# Patient Record
Sex: MALE | Race: WHITE | NOT HISPANIC OR LATINO | Employment: FULL TIME | URBAN - NONMETROPOLITAN AREA
[De-identification: names, ages, dates, MRNs, and addresses within clinical notes are randomized per-mention and may not be internally consistent; named-entity substitution may affect disease eponyms.]

---

## 2024-08-09 ENCOUNTER — APPOINTMENT (OUTPATIENT)
Dept: CT IMAGING | Facility: HOSPITAL | Age: 29
End: 2024-08-09
Payer: OTHER MISCELLANEOUS

## 2024-08-09 ENCOUNTER — HOSPITAL ENCOUNTER (EMERGENCY)
Facility: HOSPITAL | Age: 29
Discharge: HOME | End: 2024-08-09
Attending: STUDENT IN AN ORGANIZED HEALTH CARE EDUCATION/TRAINING PROGRAM
Payer: OTHER MISCELLANEOUS

## 2024-08-09 VITALS
HEIGHT: 73 IN | WEIGHT: 120 LBS | SYSTOLIC BLOOD PRESSURE: 122 MMHG | TEMPERATURE: 98 F | DIASTOLIC BLOOD PRESSURE: 82 MMHG | HEART RATE: 93 BPM | OXYGEN SATURATION: 96 % | BODY MASS INDEX: 15.9 KG/M2 | RESPIRATION RATE: 17 BRPM

## 2024-08-09 DIAGNOSIS — F07.81 POSTCONCUSSIVE SYNDROME: Primary | ICD-10-CM

## 2024-08-09 PROCEDURE — 70450 CT HEAD/BRAIN W/O DYE: CPT

## 2024-08-09 PROCEDURE — 99284 EMERGENCY DEPT VISIT MOD MDM: CPT

## 2024-08-09 NOTE — ED PROVIDER NOTES
Pt Name: Suleiman Urena  MRN: [id number]  : 1995  Date of Encounter: 2024    PCP: Provider, No Known      Subjective    History of Present Illness:    Chief Complaint: Headache    History of Present Illness: Suleiman Urena is a 28 y.o. male who presents to the ER complaining of headache, swelling that started after hitting his head on a metal shelf while at work just prior to arrival.  Pain is described as Dull, Throbbing, Constant, and does to radiate   Patient rates pain as a 6 on a ten scale.    Triage Vitals:    ED Triage Vitals [24 1644]   Temp Heart Rate Resp BP SpO2   98 °F (36.7 °C) 93 17 122/82 96 %      Temp src Heart Rate Source Patient Position BP Location FiO2 (%)   Oral Monitor Sitting Left arm --       Nurses Notes reviewed and agree, including vitals, allergies, social history and prior medical history.     Patient has no known allergies.    No past medical history on file.    No past surgical history on file.    Social History     Socioeconomic History    Marital status: Single       No family history on file.    REVIEW OF SYSTEMS:     All systems reviewed and not pertinent unless noted.    Review of Systems   Neurological:  Positive for headaches.   All other systems reviewed and are negative.      Objective    Physical Exam  Vitals and nursing note reviewed.   Constitutional:       Appearance: Normal appearance.   HENT:      Head: Normocephalic.        Comments: Small area of ecchymosis and small hematoma no break in skin  Eyes:      Extraocular Movements: Extraocular movements intact.      Pupils: Pupils are equal, round, and reactive to light.   Cardiovascular:      Rate and Rhythm: Normal rate and regular rhythm.      Pulses: Normal pulses.      Heart sounds: Normal heart sounds.   Pulmonary:      Effort: Pulmonary effort is normal.      Breath sounds: Normal breath sounds.   Abdominal:      General: Bowel sounds are normal.      Palpations: Abdomen is soft.   Musculoskeletal:          General: Normal range of motion.      Cervical back: Normal range of motion and neck supple.   Skin:     General: Skin is warm and dry.      Capillary Refill: Capillary refill takes less than 2 seconds.   Neurological:      Mental Status: He is alert.      GCS: GCS eye subscore is 4. GCS verbal subscore is 5. GCS motor subscore is 6.      Sensory: Sensation is intact.      Motor: Motor function is intact.   Psychiatric:         Attention and Perception: Attention and perception normal.         Mood and Affect: Mood and affect normal.         Speech: Speech normal.                           Procedures    ED Course:    No orders to display       ED Course as of 08/09/24 1819   Fri Aug 09, 2024   1813 Radiographic images from CT head independently interpreted by me prior to radiology overread and shows no acute intracranial abnormality,    [KH]      ED Course User Index  [KH] Huan Herman APRN       Orders placed during this visit:    Orders Placed This Encounter   Procedures    CT Head Without Contrast       LAB Results:    Lab Results (last 24 hours)       ** No results found for the last 24 hours. **             If labs were ordered, I have independently reviewed the results and considered them in the diagnosis and treatment plan for the patient    RADIOLOGY    No radiology results from the last 24 hrs     If I have ordered, I have independently reviewed the above noted radiographic studies.  Please see the radiologist dictation for the official interpretation    Medications given to patient in the ER    Medications - No data to display    AS OF 18:19 EDT VITALS:    BP - 122/82  HR - 93  TEMP - 98 °F (36.7 °C) (Oral)  O2 SATS - 96%         Shared Decision Making: After my consideration of the clinical presentation and laboratory/radiology studies obtained, I have discussed the findings with the patient/patient representative who is in agreement with the treatment plan and final disposition. Risks and  benefits of discharge and/or observation admission were discussed.  Final disposition of the patient will be discharged home.  Patient is requested to follow-up with primary care provider and specialist in 1 week following final discharge.    Discharge Medications Prescribed:  No new home medications were prescribed during this ER visit    Medical Decision Making   Suleiman Urena is a 28 y.o. male who presents to the ER complaining of headache, swelling that started after hitting his head on a metal shelf while at work just prior to arrival.  Pain is described as Dull, Throbbing, Constant, and does to radiate   Patient rates pain as a 6 on a ten scale.    DDX: includes but is not limited to: Intracranial abnormality, concussion, postconcussive syndrome, other    Problems Addressed:  Postconcussive syndrome: complicated acute illness or injury    Amount and/or Complexity of Data Reviewed  Radiology: ordered and independent interpretation performed. Decision-making details documented in ED Course.     Details: I have personally reviewed and documented all results  Discussion of management or test interpretation with external provider(s): Discussed assessment, treatment and plan with ER attending    Risk  Risk Details: I have discussed with patient the finding of the test preformed today. Patient has been diagnosed with postconcussive syndrome and will be discharged home.  Patient requested to follow-up with primary care provider within the next 7 days for reevaluation. Strict return precautions have been given and patient verbalizes understanding        Final diagnoses:   Postconcussive syndrome       Please note that portions of this document were completed using voice recognition dictation software.       Huan Herman, APRN  08/09/24 [id number]

## 2025-01-04 ENCOUNTER — HOSPITAL ENCOUNTER (EMERGENCY)
Facility: HOSPITAL | Age: 30
Discharge: HOME | End: 2025-01-04
Attending: EMERGENCY MEDICINE
Payer: MEDICAID

## 2025-01-04 VITALS
BODY MASS INDEX: 15.9 KG/M2 | RESPIRATION RATE: 18 BRPM | OXYGEN SATURATION: 99 % | HEIGHT: 73 IN | DIASTOLIC BLOOD PRESSURE: 80 MMHG | HEART RATE: 67 BPM | WEIGHT: 120 LBS | TEMPERATURE: 97.5 F | SYSTOLIC BLOOD PRESSURE: 121 MMHG

## 2025-01-04 DIAGNOSIS — K02.9 PAIN DUE TO DENTAL CARIES: Primary | ICD-10-CM

## 2025-01-04 PROCEDURE — 99283 EMERGENCY DEPT VISIT LOW MDM: CPT | Performed by: EMERGENCY MEDICINE

## 2025-01-04 RX ORDER — LIDOCAINE HYDROCHLORIDE 20 MG/ML
10 SOLUTION OROPHARYNGEAL ONCE
Status: COMPLETED | OUTPATIENT
Start: 2025-01-04 | End: 2025-01-04

## 2025-01-04 RX ADMIN — LIDOCAINE HYDROCHLORIDE 10 ML: 20 SOLUTION ORAL at 13:16

## 2025-01-04 RX ADMIN — TOPICAL ANESTHETIC 1 SPRAY: 200 SPRAY DENTAL; PERIODONTAL at 13:16

## 2025-01-04 NOTE — ED PROVIDER NOTES
Pt Name: Suleiman Urena  MRN: [id number]  : 1995  Date of Encounter: 2025    PCP: Provider, No Known      Subjective    History of Present Illness:    Chief Complaint: Dental pain    History of Present Illness: Suleiman Urena is a 29 y.o. male who presents to the ER complaining of dental pain that started 2 days ago.  Pain is a dull continual ache in the top right corner of his upper jaw.  Patient denies any trouble swallowing cough congestion nausea vomiting fever or facial swelling.  Pain is described as 2 days. patient rates pain as a 5 on a ten scale.    Triage Vitals:    ED Triage Vitals [25 1259]   Temp Heart Rate Resp BP SpO2   97.5 °F (36.4 °C) 67 18 121/80 99 %      Temp src Heart Rate Source Patient Position BP Location FiO2 (%)   Oral Monitor Sitting Left arm --       Nurses Notes reviewed and agree, including vitals, allergies, social history and prior medical history.     Patient has no known allergies.    History reviewed. No pertinent past medical history.    History reviewed. No pertinent surgical history.    Social History     Socioeconomic History    Marital status: Single       History reviewed. No pertinent family history.    REVIEW OF SYSTEMS:     All systems reviewed and not pertinent unless noted.    Review of Systems   HENT:  Positive for dental problem.    All other systems reviewed and are negative.      Objective    Physical Exam  Vitals and nursing note reviewed.   Constitutional:       Appearance: Normal appearance.   HENT:      Head: Normocephalic and atraumatic.      Mouth/Throat:      Dentition: Dental tenderness and dental caries present.   Eyes:      Extraocular Movements: Extraocular movements intact.      Pupils: Pupils are equal, round, and reactive to light.   Cardiovascular:      Rate and Rhythm: Normal rate and regular rhythm.      Pulses: Normal pulses.      Heart sounds: Normal heart sounds.   Pulmonary:      Effort: Pulmonary effort is normal.      Breath  sounds: Normal breath sounds.   Musculoskeletal:      Cervical back: Normal range of motion and neck supple.   Skin:     General: Skin is warm and dry.      Capillary Refill: Capillary refill takes less than 2 seconds.   Neurological:      Mental Status: He is alert and oriented to person, place, and time. Mental status is at baseline.      GCS: GCS eye subscore is 4. GCS verbal subscore is 5. GCS motor subscore is 6.      Sensory: Sensation is intact.      Motor: Motor function is intact.   Psychiatric:         Attention and Perception: Attention and perception normal.         Mood and Affect: Affect normal.         Speech: Speech normal.                           Procedures    ED Course:    No orders to display            Orders placed during this visit:    Orders Placed This Encounter   Procedures    Obtain & Apply The Following-       LAB Results:    Lab Results (last 24 hours)       ** No results found for the last 24 hours. **             If labs were ordered, I have independently reviewed the results and considered them in the diagnosis and treatment plan for the patient    RADIOLOGY    No radiology results from the last 24 hrs     If I have ordered, I have independently reviewed the above noted radiographic studies.  Please see the radiologist dictation for the official interpretation    Medications given to patient in the ER    Medications   Lidocaine Viscous HCl (XYLOCAINE) 2 % solution 10 mL (has no administration in time range)   benzocaine (HURRICAINE) 20 % liquid solution 1 spray (has no administration in time range)       AS OF 13:16 EST VITALS:    BP - 121/80  HR - 67  TEMP - 97.5 °F (36.4 °C) (Oral)  O2 SATS - 99%         Shared Decision Making: After my consideration of the clinical presentation and laboratory/radiology studies obtained, I have discussed the findings with the patient/patient representative who is in agreement with the treatment plan and final disposition. Risks and benefits of  discharge and/or observation admission were discussed.  Final disposition of the patient will be discharged home.  Patient is requested to follow-up with primary care provider and specialist in 1 week following final discharge.      Medical Decision Making  Suleiman Urena is a 29 y.o. male who presents to the ER complaining of dental pain that started 2 days ago.  Pain is a dull continual ache in the top right corner of his upper jaw.  Patient denies any trouble swallowing cough congestion nausea vomiting fever or facial swelling.  Pain is described as 2 days. patient rates pain as a 5 on a ten scale.    DDX: includes but is not limited to: Dental pain    Problems Addressed:  Pain due to dental caries: complicated acute illness or injury    Amount and/or Complexity of Data Reviewed  Discussion of management or test interpretation with external provider(s): Discussed assessment, treatment and plan with ER attending    Risk  OTC drugs.  Prescription drug management.  Risk Details: I have discussed with patient the finding of the test preformed today. Patient has been diagnosed with dental pain due to dental caries and will be discharged home. Advised on return precautions the importance of close follow-up with primary care provider.  Strict return precautions have been given and patient verbalizes understanding        Final diagnoses:   Pain due to dental caries       Please note that portions of this document were completed using voice recognition dictation software.       Huan Herman, HESHAM  01/04/25 [id number]

## 2025-01-12 ENCOUNTER — APPOINTMENT (OUTPATIENT)
Dept: GENERAL RADIOLOGY | Facility: HOSPITAL | Age: 30
End: 2025-01-12
Payer: MEDICAID

## 2025-01-12 ENCOUNTER — HOSPITAL ENCOUNTER (EMERGENCY)
Facility: HOSPITAL | Age: 30
Discharge: HOME | End: 2025-01-12
Attending: STUDENT IN AN ORGANIZED HEALTH CARE EDUCATION/TRAINING PROGRAM | Admitting: STUDENT IN AN ORGANIZED HEALTH CARE EDUCATION/TRAINING PROGRAM
Payer: MEDICAID

## 2025-01-12 VITALS
OXYGEN SATURATION: 100 % | DIASTOLIC BLOOD PRESSURE: 73 MMHG | RESPIRATION RATE: 18 BRPM | WEIGHT: 136 LBS | HEIGHT: 72 IN | BODY MASS INDEX: 18.42 KG/M2 | HEART RATE: 98 BPM | TEMPERATURE: 97.5 F | SYSTOLIC BLOOD PRESSURE: 126 MMHG

## 2025-01-12 DIAGNOSIS — S60.131A CONTUSION OF RIGHT MIDDLE FINGER WITH DAMAGE TO NAIL, INITIAL ENCOUNTER: Primary | ICD-10-CM

## 2025-01-12 PROCEDURE — 73130 X-RAY EXAM OF HAND: CPT

## 2025-01-12 PROCEDURE — 99283 EMERGENCY DEPT VISIT LOW MDM: CPT | Performed by: STUDENT IN AN ORGANIZED HEALTH CARE EDUCATION/TRAINING PROGRAM

## 2025-01-12 RX ORDER — ACETAMINOPHEN 500 MG
1000 TABLET ORAL ONCE
Status: COMPLETED | OUTPATIENT
Start: 2025-01-12 | End: 2025-01-12

## 2025-01-12 RX ORDER — IBUPROFEN 800 MG/1
800 TABLET, FILM COATED ORAL ONCE
Status: COMPLETED | OUTPATIENT
Start: 2025-01-12 | End: 2025-01-12

## 2025-01-12 RX ORDER — BACITRACIN ZINC 500 [USP'U]/G
1 OINTMENT TOPICAL ONCE
Status: COMPLETED | OUTPATIENT
Start: 2025-01-12 | End: 2025-01-12

## 2025-01-12 RX ADMIN — ACETAMINOPHEN 1000 MG: 500 TABLET, FILM COATED ORAL at 21:43

## 2025-01-12 RX ADMIN — IBUPROFEN 800 MG: 800 TABLET, FILM COATED ORAL at 21:43

## 2025-01-12 RX ADMIN — BACITRACIN ZINC 1 APPLICATION: 500 OINTMENT TOPICAL at 21:43

## 2025-01-13 ENCOUNTER — HOSPITAL ENCOUNTER (EMERGENCY)
Facility: HOSPITAL | Age: 30
Discharge: HOME | End: 2025-01-14
Attending: STUDENT IN AN ORGANIZED HEALTH CARE EDUCATION/TRAINING PROGRAM | Admitting: STUDENT IN AN ORGANIZED HEALTH CARE EDUCATION/TRAINING PROGRAM
Payer: MEDICAID

## 2025-01-13 DIAGNOSIS — R11.2 NAUSEA AND VOMITING, UNSPECIFIED VOMITING TYPE: Primary | ICD-10-CM

## 2025-01-13 LAB
ALBUMIN SERPL-MCNC: 4.8 G/DL (ref 3.5–5.2)
ALBUMIN/GLOB SERPL: 1.7 G/DL
ALP SERPL-CCNC: 49 U/L (ref 39–117)
ALT SERPL W P-5'-P-CCNC: 12 U/L (ref 1–41)
ANION GAP SERPL CALCULATED.3IONS-SCNC: 14.6 MMOL/L (ref 5–15)
AST SERPL-CCNC: 16 U/L (ref 1–40)
BASOPHILS # BLD AUTO: 0.09 10*3/MM3 (ref 0–0.2)
BASOPHILS NFR BLD AUTO: 1 % (ref 0–1.5)
BILIRUB SERPL-MCNC: 0.6 MG/DL (ref 0–1.2)
BUN SERPL-MCNC: 12 MG/DL (ref 6–20)
BUN/CREAT SERPL: 10.3 (ref 7–25)
CALCIUM SPEC-SCNC: 9.6 MG/DL (ref 8.6–10.5)
CHLORIDE SERPL-SCNC: 104 MMOL/L (ref 98–107)
CO2 SERPL-SCNC: 22.4 MMOL/L (ref 22–29)
CREAT SERPL-MCNC: 1.17 MG/DL (ref 0.76–1.27)
DEPRECATED RDW RBC AUTO: 42 FL (ref 37–54)
EGFRCR SERPLBLD CKD-EPI 2021: 87.6 ML/MIN/1.73
EOSINOPHIL # BLD AUTO: 0.01 10*3/MM3 (ref 0–0.4)
EOSINOPHIL NFR BLD AUTO: 0.1 % (ref 0.3–6.2)
ERYTHROCYTE [DISTWIDTH] IN BLOOD BY AUTOMATED COUNT: 12.4 % (ref 12.3–15.4)
GLOBULIN UR ELPH-MCNC: 2.8 GM/DL
GLUCOSE SERPL-MCNC: 95 MG/DL (ref 65–99)
HCT VFR BLD AUTO: 44.1 % (ref 37.5–51)
HGB BLD-MCNC: 15.3 G/DL (ref 13–17.7)
HOLD SPECIMEN: NORMAL
HOLD SPECIMEN: NORMAL
IMM GRANULOCYTES # BLD AUTO: 0.02 10*3/MM3 (ref 0–0.05)
IMM GRANULOCYTES NFR BLD AUTO: 0.2 % (ref 0–0.5)
LIPASE SERPL-CCNC: 35 U/L (ref 13–60)
LYMPHOCYTES # BLD AUTO: 3.51 10*3/MM3 (ref 0.7–3.1)
LYMPHOCYTES NFR BLD AUTO: 39.1 % (ref 19.6–45.3)
MCH RBC QN AUTO: 31.8 PG (ref 26.6–33)
MCHC RBC AUTO-ENTMCNC: 34.7 G/DL (ref 31.5–35.7)
MCV RBC AUTO: 91.7 FL (ref 79–97)
MONOCYTES # BLD AUTO: 0.86 10*3/MM3 (ref 0.1–0.9)
MONOCYTES NFR BLD AUTO: 9.6 % (ref 5–12)
NEUTROPHILS NFR BLD AUTO: 4.48 10*3/MM3 (ref 1.7–7)
NEUTROPHILS NFR BLD AUTO: 50 % (ref 42.7–76)
NRBC BLD AUTO-RTO: 0 /100 WBC (ref 0–0.2)
PLATELET # BLD AUTO: 325 10*3/MM3 (ref 140–450)
PMV BLD AUTO: 9.6 FL (ref 6–12)
POTASSIUM SERPL-SCNC: 4 MMOL/L (ref 3.5–5.2)
PROT SERPL-MCNC: 7.6 G/DL (ref 6–8.5)
RBC # BLD AUTO: 4.81 10*6/MM3 (ref 4.14–5.8)
S PYO AG THROAT QL: NEGATIVE
SODIUM SERPL-SCNC: 141 MMOL/L (ref 136–145)
WBC NRBC COR # BLD AUTO: 8.97 10*3/MM3 (ref 3.4–10.8)
WHOLE BLOOD HOLD COAG: NORMAL
WHOLE BLOOD HOLD SPECIMEN: NORMAL

## 2025-01-13 PROCEDURE — 80053 COMPREHEN METABOLIC PANEL: CPT

## 2025-01-13 PROCEDURE — 99283 EMERGENCY DEPT VISIT LOW MDM: CPT | Performed by: STUDENT IN AN ORGANIZED HEALTH CARE EDUCATION/TRAINING PROGRAM

## 2025-01-13 PROCEDURE — 85025 COMPLETE CBC W/AUTO DIFF WBC: CPT

## 2025-01-13 PROCEDURE — 87880 STREP A ASSAY W/OPTIC: CPT

## 2025-01-13 PROCEDURE — 96374 THER/PROPH/DIAG INJ IV PUSH: CPT

## 2025-01-13 PROCEDURE — 25010000002 ONDANSETRON PER 1 MG

## 2025-01-13 PROCEDURE — 25810000003 SODIUM CHLORIDE 0.9 % SOLUTION

## 2025-01-13 PROCEDURE — 0202U NFCT DS 22 TRGT SARS-COV-2: CPT

## 2025-01-13 PROCEDURE — 87081 CULTURE SCREEN ONLY: CPT

## 2025-01-13 PROCEDURE — 83690 ASSAY OF LIPASE: CPT

## 2025-01-13 RX ORDER — ALUMINA, MAGNESIA, AND SIMETHICONE 2400; 2400; 240 MG/30ML; MG/30ML; MG/30ML
15 SUSPENSION ORAL ONCE
Status: COMPLETED | OUTPATIENT
Start: 2025-01-14 | End: 2025-01-13

## 2025-01-13 RX ORDER — LIDOCAINE HYDROCHLORIDE 20 MG/ML
15 SOLUTION OROPHARYNGEAL ONCE
Status: COMPLETED | OUTPATIENT
Start: 2025-01-14 | End: 2025-01-13

## 2025-01-13 RX ORDER — SUCRALFATE 1 G/1
1 TABLET ORAL ONCE
Status: COMPLETED | OUTPATIENT
Start: 2025-01-14 | End: 2025-01-13

## 2025-01-13 RX ORDER — ONDANSETRON 2 MG/ML
4 INJECTION INTRAMUSCULAR; INTRAVENOUS ONCE
Status: COMPLETED | OUTPATIENT
Start: 2025-01-14 | End: 2025-01-13

## 2025-01-13 RX ORDER — SODIUM CHLORIDE 0.9 % (FLUSH) 0.9 %
10 SYRINGE (ML) INJECTION AS NEEDED
Status: DISCONTINUED | OUTPATIENT
Start: 2025-01-13 | End: 2025-01-14 | Stop reason: HOSPADM

## 2025-01-13 RX ADMIN — LIDOCAINE HYDROCHLORIDE 15 ML: 20 SOLUTION ORAL at 23:51

## 2025-01-13 RX ADMIN — ALUMINUM HYDROXIDE, MAGNESIUM HYDROXIDE, AND DIMETHICONE 15 ML: 400; 400; 40 SUSPENSION ORAL at 23:51

## 2025-01-13 RX ADMIN — SUCRALFATE 1 G: 1 TABLET ORAL at 23:51

## 2025-01-13 RX ADMIN — SODIUM CHLORIDE 1000 ML: 9 INJECTION, SOLUTION INTRAVENOUS at 23:52

## 2025-01-13 RX ADMIN — ONDANSETRON 4 MG: 2 INJECTION INTRAMUSCULAR; INTRAVENOUS at 23:51

## 2025-01-13 NOTE — ED PROVIDER NOTES
[facility] EMERGENCY DEPARTMENT  Emergency Department Encounter  Emergency Medicine Physician Note       Pt Name: Suleiman Urena  MRN: [id number]  Pt :   1995  Room Number:  24SF/24  Date of encounter:  2025  PCP: Provider, No Known  ED Provider: Trent Garza MD    Historian: Patient      HPI:  Chief Complaint: Finger injury        Context: Suleiman Urena is a 29 y.o. male who presents to the ED c/o finger injury.  Patient states that he accidentally slammed his fingers in a car door.  Incident happened around 5 PM.  No other symptoms reported at this time.      PAST MEDICAL HISTORY  Past Medical History:   Diagnosis Date    Deliberate self-cutting          PAST SURGICAL HISTORY  History reviewed. No pertinent surgical history.      FAMILY HISTORY  History reviewed. No pertinent family history.      SOCIAL HISTORY  Social History     Socioeconomic History    Marital status: Single   Tobacco Use    Smoking status: Never    Smokeless tobacco: Never   Vaping Use    Vaping status: Every Day   Substance and Sexual Activity    Alcohol use: Never    Drug use: Not Currently     Types: Marijuana    Sexual activity: Defer         ALLERGIES  Patient has no known allergies.        REVIEW OF SYSTEMS  Review of Systems     All systems reviewed and negative except for those discussed in HPI.       PHYSICAL EXAM    I have reviewed the triage vital signs and nursing notes.    ED Triage Vitals [25 2101]   Temp Heart Rate Resp BP SpO2   97.5 °F (36.4 °C) 98 18 126/73 100 %      Temp src Heart Rate Source Patient Position BP Location FiO2 (%)   Oral Monitor Sitting Left arm --       Physical Exam    General:  Awake, alert, no acute distress  HEENT: Atraumatic, normocephalic, EOMI, PERRLA, mucous membranes moist  NECK:  Supple, atraumatic  Cardiovascular:  Regular rate.  all extremities well perfused  Respiratory:  Regular rate, equal chest rise. No resp distress  Abdominal:  Soft, nondistended    Extremity: Swelling with abrasions on middle finger and ring finger of right hand with some reduced range of motion secondary to pain.  Normal inspection of right hand otherwise no visible bony abnormalities in all 4 extremities.  Full range of motion of all other extremities.        LAB RESULTS  No results found for this or any previous visit (from the past 24 hours).    If labs were ordered, I independently evaluated the results and considered them in treating the patient.          PROCEDURES    Procedures    No orders to display       MEDICATIONS GIVEN IN ER    Medications   acetaminophen (TYLENOL) tablet 1,000 mg (1,000 mg Oral Given 1/12/25 2143)   ibuprofen (ADVIL,MOTRIN) tablet 800 mg (800 mg Oral Given 1/12/25 2143)   bacitracin ointment 1 Application (1 Application Topical Given 1/12/25 2143)         MEDICAL DECISION MAKING, PROGRESS, and CONSULTS    All labs, if obtained, have been independently reviewed by me.  All radiology studies, if obtained, have been evaluated by me and the radiologist dictating the report.  All EKG's, if obtained, have been independently viewed and interpreted by me.      Discussion below represents my analysis of pertinent findings related to patient's condition, differential diagnosis, treatment plan and final disposition.    Suleiman Urena is a 29 y.o. male who presents to the ED c/o finger injuries.  Patient has mild abrasions along fingers with some reduced range of motion on exam.  Differential includes but is not limited to contusion and fracture, with dislocation much less likely.  Patient given Tylenol and Motrin for pain control and x-rays obtained of right hand/fingers.    X-rays of right hand personally interpreted showing no large fracture or dislocation.    Advised on continued wound care with topical over-the-counter antibiotics to prevent infection and on continued use of anti-inflammatories for pain control.  Patient agreeable with plan and was discharged.                              Orders placed during this visit:  Orders Placed This Encounter   Procedures    XR Hand 3+ View Right         ED Course:    Consultants: None                Shared Decision Making:  After my consideration of clinical presentation and any laboratory/radiology studies obtained, I discussed the findings with the patient/patient representative who is in agreement with the treatment plan and the final disposition.   Risks and benefits of discharge and/or observation/admission were discussed.      AS OF 22:12 EST VITALS:    BP - 126/73  HR - 98  TEMP - 97.5 °F (36.4 °C) (Oral)  O2 SATS - 100%                  DIAGNOSIS  Final diagnoses:   Contusion of right middle finger with damage to nail, initial encounter         DISPOSITION  Discharge      Please note that portions of this document were completed with voice recognition software.        Trent Garza MD  01/12/25 [id number]

## 2025-01-13 NOTE — DISCHARGE INSTRUCTIONS
Continue to use ice pack to help with swelling and pain along with Tylenol and Motrin.  Follow-up closely with primary care provider if pain does not improve despite conservative treatment.

## 2025-01-14 VITALS
WEIGHT: 135 LBS | SYSTOLIC BLOOD PRESSURE: 112 MMHG | DIASTOLIC BLOOD PRESSURE: 70 MMHG | BODY MASS INDEX: 18.28 KG/M2 | RESPIRATION RATE: 16 BRPM | TEMPERATURE: 98.1 F | HEIGHT: 72 IN | OXYGEN SATURATION: 98 % | HEART RATE: 72 BPM

## 2025-01-14 LAB
AMPHET+METHAMPHET UR QL: NEGATIVE
AMPHETAMINES UR QL: NEGATIVE
B PARAPERT DNA SPEC QL NAA+PROBE: NOT DETECTED
B PERT DNA SPEC QL NAA+PROBE: NOT DETECTED
BARBITURATES UR QL SCN: NEGATIVE
BENZODIAZ UR QL SCN: NEGATIVE
BILIRUB UR QL STRIP: NEGATIVE
BUPRENORPHINE SERPL-MCNC: NEGATIVE NG/ML
C PNEUM DNA NPH QL NAA+NON-PROBE: NOT DETECTED
CANNABINOIDS SERPL QL: POSITIVE
CLARITY UR: CLEAR
COCAINE UR QL: NEGATIVE
COLOR UR: YELLOW
FENTANYL UR-MCNC: NEGATIVE NG/ML
FLUAV SUBTYP SPEC NAA+PROBE: NOT DETECTED
FLUBV RNA ISLT QL NAA+PROBE: NOT DETECTED
GLUCOSE UR STRIP-MCNC: NEGATIVE MG/DL
HADV DNA SPEC NAA+PROBE: NOT DETECTED
HCOV 229E RNA SPEC QL NAA+PROBE: NOT DETECTED
HCOV HKU1 RNA SPEC QL NAA+PROBE: NOT DETECTED
HCOV NL63 RNA SPEC QL NAA+PROBE: NOT DETECTED
HCOV OC43 RNA SPEC QL NAA+PROBE: NOT DETECTED
HGB UR QL STRIP.AUTO: NEGATIVE
HMPV RNA NPH QL NAA+NON-PROBE: NOT DETECTED
HPIV1 RNA ISLT QL NAA+PROBE: NOT DETECTED
HPIV2 RNA SPEC QL NAA+PROBE: NOT DETECTED
HPIV3 RNA NPH QL NAA+PROBE: NOT DETECTED
HPIV4 P GENE NPH QL NAA+PROBE: NOT DETECTED
KETONES UR QL STRIP: ABNORMAL
LEUKOCYTE ESTERASE UR QL STRIP.AUTO: NEGATIVE
M PNEUMO IGG SER IA-ACNC: NOT DETECTED
METHADONE UR QL SCN: NEGATIVE
NITRITE UR QL STRIP: NEGATIVE
OPIATES UR QL: NEGATIVE
OXYCODONE UR QL SCN: NEGATIVE
PCP UR QL SCN: NEGATIVE
PH UR STRIP.AUTO: 6 [PH] (ref 5–8)
PROT UR QL STRIP: NEGATIVE
RHINOVIRUS RNA SPEC NAA+PROBE: NOT DETECTED
RSV RNA NPH QL NAA+NON-PROBE: NOT DETECTED
SARS-COV-2 RNA NPH QL NAA+NON-PROBE: NOT DETECTED
SP GR UR STRIP: 1.02 (ref 1–1.03)
TRICYCLICS UR QL SCN: NEGATIVE
UROBILINOGEN UR QL STRIP: ABNORMAL

## 2025-01-14 PROCEDURE — 81003 URINALYSIS AUTO W/O SCOPE: CPT

## 2025-01-14 PROCEDURE — 80307 DRUG TEST PRSMV CHEM ANLYZR: CPT

## 2025-01-14 RX ORDER — ONDANSETRON 4 MG/1
4 TABLET, ORALLY DISINTEGRATING ORAL EVERY 8 HOURS PRN
Qty: 21 TABLET | Refills: 0 | Status: SHIPPED | OUTPATIENT
Start: 2025-01-14 | End: 2025-01-21

## 2025-01-14 RX ORDER — DICYCLOMINE HCL 20 MG
20 TABLET ORAL EVERY 6 HOURS PRN
Qty: 28 TABLET | Refills: 0 | Status: SHIPPED | OUTPATIENT
Start: 2025-01-14 | End: 2025-01-21

## 2025-01-14 NOTE — ED PROVIDER NOTES
EMERGENCY DEPARTMENT ENCOUNTER    Pt Name: Suleiman Urena  MRN: [id number]  Pt :   1995  Room Number:  01SF/01  Date of encounter:  2025  PCP: Provider, No Known  ED Provider: Dandre Maldonado PA-C    Historian: patient, nursing notes      HPI:  Chief Complaint: n/v/fever        Context: Suleiman Urena is a 29 y.o. male who presents to the ED c/o nausea and vomiting for the past 3 days.  Patient also reports he has had intermittent fever at home.  He also reports associated symptoms of generalized abdominal cramps.  Patient denies any chills, chest pain, shortness of breath, dysuria, urinary frequency or urgency.      PAST MEDICAL HISTORY  Past Medical History:   Diagnosis Date    Deliberate self-cutting          PAST SURGICAL HISTORY  History reviewed. No pertinent surgical history.      FAMILY HISTORY  History reviewed. No pertinent family history.      SOCIAL HISTORY  Social History     Socioeconomic History    Marital status: Single   Tobacco Use    Smoking status: Never    Smokeless tobacco: Never   Vaping Use    Vaping status: Every Day   Substance and Sexual Activity    Alcohol use: Never    Drug use: Not Currently     Types: Marijuana    Sexual activity: Defer         ALLERGIES  Patient has no known allergies.        REVIEW OF SYSTEMS  Review of Systems   Constitutional:  Negative for chills and fever.   HENT:  Negative for congestion and sore throat.    Respiratory:  Negative for cough and shortness of breath.    Cardiovascular:  Negative for chest pain.   Gastrointestinal:  Positive for abdominal pain, nausea and vomiting.   Genitourinary:  Negative for dysuria.   Musculoskeletal:  Negative for back pain.   Skin:  Negative for wound.   Neurological:  Negative for dizziness and headaches.   Psychiatric/Behavioral:  Negative for confusion.    All other systems reviewed and are negative.         All systems reviewed and negative except for those discussed in HPI.       PHYSICAL EXAM    I have  reviewed the triage vital signs and nursing notes.    ED Triage Vitals [01/13/25 2302]   Temp Heart Rate Resp BP SpO2   98.1 °F (36.7 °C) 72 16 111/64 95 %      Temp src Heart Rate Source Patient Position BP Location FiO2 (%)   Oral Monitor Sitting Left arm --       Physical Exam  Vitals and nursing note reviewed.   Constitutional:       General: He is not in acute distress.     Appearance: Normal appearance. He is not ill-appearing, toxic-appearing or diaphoretic.   HENT:      Head: Normocephalic and atraumatic.      Right Ear: External ear normal.      Left Ear: External ear normal.      Nose: No congestion or rhinorrhea.      Mouth/Throat:      Mouth: Mucous membranes are moist.      Pharynx: Oropharynx is clear.   Eyes:      Conjunctiva/sclera: Conjunctivae normal.   Cardiovascular:      Rate and Rhythm: Normal rate.      Heart sounds: Normal heart sounds.   Pulmonary:      Effort: Pulmonary effort is normal. No respiratory distress.      Breath sounds: Normal breath sounds. No wheezing.   Abdominal:      General: There is no distension.      Palpations: Abdomen is soft.      Tenderness: There is no abdominal tenderness. There is no guarding or rebound.   Musculoskeletal:      Cervical back: Normal range of motion and neck supple.      Right lower leg: No edema.      Left lower leg: No edema.   Skin:     Findings: No rash.   Neurological:      Mental Status: He is alert.             LAB RESULTS  Recent Results (from the past 24 hours)   Rapid Strep A Screen - Swab, Throat    Collection Time: 01/13/25 11:13 PM    Specimen: Throat; Swab   Result Value Ref Range    Strep A Ag Negative Negative   Respiratory Panel PCR w/COVID-19(SARS-CoV-2) IVETT/TANMAY/SHIELA/PAD/COR/SAKSHI In-House, NP Swab in [facility], 2 HR TAT - Swab, Nasopharynx    Collection Time: 01/13/25 11:13 PM    Specimen: Nasopharynx; Swab   Result Value Ref Range    ADENOVIRUS, PCR Not Detected Not Detected    Coronavirus 229E Not Detected Not Detected     Coronavirus HKU1 Not Detected Not Detected    Coronavirus NL63 Not Detected Not Detected    Coronavirus OC43 Not Detected Not Detected    COVID19 Not Detected Not Detected - Ref. Range    Human Metapneumovirus Not Detected Not Detected    Human Rhinovirus/Enterovirus Not Detected Not Detected    Influenza A PCR Not Detected Not Detected    Influenza B PCR Not Detected Not Detected    Parainfluenza Virus 1 Not Detected Not Detected    Parainfluenza Virus 2 Not Detected Not Detected    Parainfluenza Virus 3 Not Detected Not Detected    Parainfluenza Virus 4 Not Detected Not Detected    RSV, PCR Not Detected Not Detected    Bordetella pertussis pcr Not Detected Not Detected    Bordetella parapertussis PCR Not Detected Not Detected    Chlamydophila pneumoniae PCR Not Detected Not Detected    Mycoplasma pneumo by PCR Not Detected Not Detected   Green Top (Gel)    Collection Time: 01/13/25 11:13 PM   Result Value Ref Range    Extra Tube Hold for add-ons.    Lavender Top    Collection Time: 01/13/25 11:13 PM   Result Value Ref Range    Extra Tube hold for add-on    Gold Top - SST    Collection Time: 01/13/25 11:13 PM   Result Value Ref Range    Extra Tube Hold for add-ons.    Light Blue Top    Collection Time: 01/13/25 11:13 PM   Result Value Ref Range    Extra Tube Hold for add-ons.    Comprehensive Metabolic Panel    Collection Time: 01/13/25 11:13 PM    Specimen: Blood   Result Value Ref Range    Glucose 95 65 - 99 mg/dL    BUN 12 6 - 20 mg/dL    Creatinine 1.17 0.76 - 1.27 mg/dL    Sodium 141 136 - 145 mmol/L    Potassium 4.0 3.5 - 5.2 mmol/L    Chloride 104 98 - 107 mmol/L    CO2 22.4 22.0 - 29.0 mmol/L    Calcium 9.6 8.6 - 10.5 mg/dL    Total Protein 7.6 6.0 - 8.5 g/dL    Albumin 4.8 3.5 - 5.2 g/dL    ALT (SGPT) 12 1 - 41 U/L    AST (SGOT) 16 1 - 40 U/L    Alkaline Phosphatase 49 39 - 117 U/L    Total Bilirubin 0.6 0.0 - 1.2 mg/dL    Globulin 2.8 gm/dL    A/G Ratio 1.7 g/dL    BUN/Creatinine Ratio 10.3 7.0 - 25.0     Anion Gap 14.6 5.0 - 15.0 mmol/L    eGFR 87.6 >60.0 mL/min/1.73   Lipase    Collection Time: 01/13/25 11:13 PM    Specimen: Blood   Result Value Ref Range    Lipase 35 13 - 60 U/L   CBC Auto Differential    Collection Time: 01/13/25 11:13 PM    Specimen: Blood   Result Value Ref Range    WBC 8.97 3.40 - 10.80 10*3/mm3    RBC 4.81 4.14 - 5.80 10*6/mm3    Hemoglobin 15.3 13.0 - 17.7 g/dL    Hematocrit 44.1 37.5 - 51.0 %    MCV 91.7 79.0 - 97.0 fL    MCH 31.8 26.6 - 33.0 pg    MCHC 34.7 31.5 - 35.7 g/dL    RDW 12.4 12.3 - 15.4 %    RDW-SD 42.0 37.0 - 54.0 fl    MPV 9.6 6.0 - 12.0 fL    Platelets 325 140 - 450 10*3/mm3    Neutrophil % 50.0 42.7 - 76.0 %    Lymphocyte % 39.1 19.6 - 45.3 %    Monocyte % 9.6 5.0 - 12.0 %    Eosinophil % 0.1 (L) 0.3 - 6.2 %    Basophil % 1.0 0.0 - 1.5 %    Immature Grans % 0.2 0.0 - 0.5 %    Neutrophils, Absolute 4.48 1.70 - 7.00 10*3/mm3    Lymphocytes, Absolute 3.51 (H) 0.70 - 3.10 10*3/mm3    Monocytes, Absolute 0.86 0.10 - 0.90 10*3/mm3    Eosinophils, Absolute 0.01 0.00 - 0.40 10*3/mm3    Basophils, Absolute 0.09 0.00 - 0.20 10*3/mm3    Immature Grans, Absolute 0.02 0.00 - 0.05 10*3/mm3    nRBC 0.0 0.0 - 0.2 /100 WBC   Urinalysis With Culture If Indicated - Urine, Clean Catch    Collection Time: 01/14/25 12:19 AM    Specimen: Urine, Clean Catch   Result Value Ref Range    Color, UA Yellow Yellow, Straw    Appearance, UA Clear Clear    pH, UA 6.0 5.0 - 8.0    Specific Gravity, UA 1.016 1.005 - 1.030    Glucose, UA Negative Negative    Ketones, UA 15 mg/dL (1+) (A) Negative    Bilirubin, UA Negative Negative    Blood, UA Negative Negative    Protein, UA Negative Negative    Leuk Esterase, UA Negative Negative    Nitrite, UA Negative Negative    Urobilinogen, UA 0.2 E.U./dL 0.2 - 1.0 E.U./dL   Urine Drug Screen - Urine, Clean Catch    Collection Time: 01/14/25 12:19 AM    Specimen: Urine, Clean Catch   Result Value Ref Range    THC, Screen, Urine Positive (A) Negative    Phencyclidine  (PCP), Urine Negative Negative    Cocaine Screen, Urine Negative Negative    Methamphetamine, Ur Negative Negative    Opiate Screen Negative Negative    Amphetamine Screen, Urine Negative Negative    Benzodiazepine Screen, Urine Negative Negative    Tricyclic Antidepressants Screen Negative Negative    Methadone Screen, Urine Negative Negative    Barbiturates Screen, Urine Negative Negative    Oxycodone Screen, Urine Negative Negative    Buprenorphine, Screen, Urine Negative Negative   Fentanyl, Urine - Urine, Clean Catch    Collection Time: 01/14/25 12:19 AM    Specimen: Urine, Clean Catch   Result Value Ref Range    Fentanyl, Urine Negative Negative       If labs were ordered, I independently reviewed the results and considered them in treating the patient.        RADIOLOGY  No Radiology Exams Resulted Within Past 24 Hours      PROCEDURES    Procedures    No orders to display       MEDICATIONS GIVEN IN ER    Medications   sodium chloride 0.9 % flush 10 mL (has no administration in time range)   sodium chloride 0.9 % bolus 1,000 mL (0 mL Intravenous Stopped 1/14/25 0022)   aluminum-magnesium hydroxide-simethicone (MAALOX MAX) 400-400-40 MG/5ML suspension 15 mL (15 mL Oral Given 1/13/25 2351)   ondansetron (ZOFRAN) injection 4 mg (4 mg Intravenous Given 1/13/25 2351)   sucralfate (CARAFATE) tablet 1 g (1 g Oral Given 1/13/25 2351)   Lidocaine Viscous HCl (XYLOCAINE) 2 % solution 15 mL (15 mL Mouth/Throat Given 1/13/25 2351)         MEDICAL DECISION MAKING, PROGRESS, and CONSULTS    All labs, if obtained, have been independently reviewed by me.  All radiology studies, if obtained, have been reviewed by me and the radiologist dictating the report.  All EKG's, if obtained, have been independently viewed and interpreted by me/my attending physician.      Discussion below represents my analysis of pertinent findings related to patient's condition, differential diagnosis, treatment plan and final  disposition.    29-year-old male presenting to the emergency department for evaluation of abdominal cramps nausea and vomiting.  Today the patient is upright alert oriented in no acute distress his vitals and pulse oximetry are all stable and within normal limits.  Generalized abdominal tenderness is noted but mild.  Laboratory studies were initiated for abdominal pain and patient was given IV fluids and Zofran.    CBC showed no leukocytosis normal H&H CMP clinically unremarkable lipase is normal lowering any suspicion for pancreatitis.  With no  transaminitis were no elevations in total bilirubin or alk phos I have no clinical suspicion for an acute intra-abdominal process such as cholecystitis choledocholithiasis or hepatitis.  Respiratory panel was negative.  Urine drug screen positive for THC.  Cannabinoid hyperemesis syndrome is in the differential.  There have been no vomiting episodes during the ED course patient has tolerated p.o. since being here I therefore feel he is stable for discharge and outpatient follow-up, repeat abdominal exam was benign with no clinical suspicion for acute intra-abdominal process that I provided strict ED return precautions the patient and his mother at bedside understand to return for any continued or specially worsening of pain symptoms.  Bentyl and Zofran prescribed for symptomatic management at discharge.                     Differential diagnosis:    Differential diagnosis included but was not limited to       Additional sources:    - Discussed/ obtained information from independent historians: Patient's girlfriend's mother at bedside    - External (non-ED) record review: Previous medical records reviewed    - Chronic or social conditions impacting care: None    Orders placed during this visit:  Orders Placed This Encounter   Procedures    Rapid Strep A Screen - Swab, Throat    Respiratory Panel PCR w/COVID-19(SARS-CoV-2) IVETT/TANMAY/SHIELA/PAD/COR/SAKSHI In-House, NP Swab in UTM/VTM,  2 HR TAT - Swab, Nasopharynx    Beta Strep Culture, Throat - Swab, Throat    [place] Draw    Comprehensive Metabolic Panel    Lipase    Urinalysis With Culture If Indicated - Urine, Clean Catch    Urine Drug Screen - Urine, Clean Catch    CBC Auto Differential    Fentanyl, Urine - Urine, Clean Catch    Insert peripheral IV    Green Top (Gel)    Lavender Top    Gold Top - SST    Light Blue Top    CBC & Differential         Additional orders considered but not ordered: None      ED Course:    Consultants: None                Shared Decision Making:  After my consideration of clinical presentation and any laboratory/radiology studies obtained, I discussed the findings with the patient/patient representative who is in agreement with the treatment plan and the final disposition.   Risks and benefits of discharge and/or observation/admission were discussed.       AS OF 01:21 EST VITALS:    BP - 112/70  HR - 72  TEMP - 98.1 °F (36.7 °C) (Oral)  O2 SATS - 98%                  DIAGNOSIS  Final diagnoses:   Nausea and vomiting, unspecified vomiting type         DISPOSITION  Discharge home      Please note that portions of this document were completed with voice recognition software.      Dandre Maldonado PA-C  01/14/25 0121

## 2025-01-16 LAB — BACTERIA SPEC AEROBE CULT: NORMAL

## 2025-05-19 ENCOUNTER — APPOINTMENT (OUTPATIENT)
Dept: GENERAL RADIOLOGY | Facility: HOSPITAL | Age: 30
End: 2025-05-19
Payer: COMMERCIAL

## 2025-05-19 ENCOUNTER — APPOINTMENT (OUTPATIENT)
Dept: CT IMAGING | Facility: HOSPITAL | Age: 30
End: 2025-05-19
Payer: COMMERCIAL

## 2025-05-19 ENCOUNTER — HOSPITAL ENCOUNTER (OUTPATIENT)
Facility: HOSPITAL | Age: 30
Discharge: HOME | End: 2025-05-20
Attending: STUDENT IN AN ORGANIZED HEALTH CARE EDUCATION/TRAINING PROGRAM | Admitting: FAMILY MEDICINE
Payer: COMMERCIAL

## 2025-05-19 DIAGNOSIS — K92.0 HEMATEMESIS, UNSPECIFIED WHETHER NAUSEA PRESENT: Primary | ICD-10-CM

## 2025-05-19 LAB
ALBUMIN SERPL-MCNC: 4.5 G/DL (ref 3.5–5.2)
ALBUMIN/GLOB SERPL: 1.7 G/DL
ALP SERPL-CCNC: 44 U/L (ref 39–117)
ALT SERPL W P-5'-P-CCNC: 10 U/L (ref 1–41)
AMPHET+METHAMPHET UR QL: NEGATIVE
AMPHETAMINES UR QL: NEGATIVE
ANION GAP SERPL CALCULATED.3IONS-SCNC: 10.3 MMOL/L (ref 5–15)
APTT PPP: 31.6 SECONDS (ref 23–35)
AST SERPL-CCNC: 13 U/L (ref 1–40)
BARBITURATES UR QL SCN: NEGATIVE
BASOPHILS # BLD AUTO: 0.1 10*3/MM3 (ref 0–0.2)
BASOPHILS NFR BLD AUTO: 1.5 % (ref 0–1.5)
BENZODIAZ UR QL SCN: NEGATIVE
BILIRUB SERPL-MCNC: 0.4 MG/DL (ref 0–1.2)
BILIRUB UR QL STRIP: NEGATIVE
BUN SERPL-MCNC: 15 MG/DL (ref 6–20)
BUN/CREAT SERPL: 15.8 (ref 7–25)
BUPRENORPHINE SERPL-MCNC: NEGATIVE NG/ML
CALCIUM SPEC-SCNC: 9.5 MG/DL (ref 8.6–10.5)
CANNABINOIDS SERPL QL: POSITIVE
CHLORIDE SERPL-SCNC: 102 MMOL/L (ref 98–107)
CLARITY UR: CLEAR
CO2 SERPL-SCNC: 24.7 MMOL/L (ref 22–29)
COCAINE UR QL: NEGATIVE
COLOR UR: YELLOW
CREAT SERPL-MCNC: 0.95 MG/DL (ref 0.76–1.27)
DEPRECATED RDW RBC AUTO: 42.6 FL (ref 37–54)
EGFRCR SERPLBLD CKD-EPI 2021: 112.5 ML/MIN/1.73
EOSINOPHIL # BLD AUTO: 0.02 10*3/MM3 (ref 0–0.4)
EOSINOPHIL NFR BLD AUTO: 0.3 % (ref 0.3–6.2)
ERYTHROCYTE [DISTWIDTH] IN BLOOD BY AUTOMATED COUNT: 12.6 % (ref 12.3–15.4)
FENTANYL UR-MCNC: NEGATIVE NG/ML
GEN 5 1HR TROPONIN T REFLEX: <6 NG/L
GLOBULIN UR ELPH-MCNC: 2.7 GM/DL
GLUCOSE SERPL-MCNC: 94 MG/DL (ref 65–99)
GLUCOSE UR STRIP-MCNC: NEGATIVE MG/DL
HCT VFR BLD AUTO: 39.1 % (ref 37.5–51)
HCT VFR BLD AUTO: 40.5 % (ref 37.5–51)
HGB BLD-MCNC: 13.2 G/DL (ref 13–17.7)
HGB BLD-MCNC: 14.1 G/DL (ref 13–17.7)
HGB UR QL STRIP.AUTO: NEGATIVE
HOLD SPECIMEN: NORMAL
HOLD SPECIMEN: NORMAL
IMM GRANULOCYTES # BLD AUTO: 0.02 10*3/MM3 (ref 0–0.05)
IMM GRANULOCYTES NFR BLD AUTO: 0.3 % (ref 0–0.5)
INR PPP: 1.18 (ref 0.9–1.1)
KETONES UR QL STRIP: ABNORMAL
LEUKOCYTE ESTERASE UR QL STRIP.AUTO: NEGATIVE
LYMPHOCYTES # BLD AUTO: 1.67 10*3/MM3 (ref 0.7–3.1)
LYMPHOCYTES NFR BLD AUTO: 24.5 % (ref 19.6–45.3)
MCH RBC QN AUTO: 31.8 PG (ref 26.6–33)
MCHC RBC AUTO-ENTMCNC: 34.8 G/DL (ref 31.5–35.7)
MCV RBC AUTO: 91.4 FL (ref 79–97)
METHADONE UR QL SCN: NEGATIVE
MONOCYTES # BLD AUTO: 0.58 10*3/MM3 (ref 0.1–0.9)
MONOCYTES NFR BLD AUTO: 8.5 % (ref 5–12)
NEUTROPHILS NFR BLD AUTO: 4.44 10*3/MM3 (ref 1.7–7)
NEUTROPHILS NFR BLD AUTO: 64.9 % (ref 42.7–76)
NITRITE UR QL STRIP: NEGATIVE
NRBC BLD AUTO-RTO: 0 /100 WBC (ref 0–0.2)
OPIATES UR QL: NEGATIVE
OXYCODONE UR QL SCN: NEGATIVE
PCP UR QL SCN: NEGATIVE
PH UR STRIP.AUTO: 6.5 [PH] (ref 5–8)
PLATELET # BLD AUTO: 294 10*3/MM3 (ref 140–450)
PMV BLD AUTO: 9.8 FL (ref 6–12)
POTASSIUM SERPL-SCNC: 4 MMOL/L (ref 3.5–5.2)
PROT SERPL-MCNC: 7.2 G/DL (ref 6–8.5)
PROT UR QL STRIP: NEGATIVE
PROTHROMBIN TIME: 15.8 SECONDS (ref 12.3–15.1)
RBC # BLD AUTO: 4.43 10*6/MM3 (ref 4.14–5.8)
SODIUM SERPL-SCNC: 137 MMOL/L (ref 136–145)
SP GR UR STRIP: 1.03 (ref 1–1.03)
TRICYCLICS UR QL SCN: NEGATIVE
TROPONIN T NUMERIC DELTA: NORMAL
TROPONIN T SERPL HS-MCNC: <6 NG/L
UROBILINOGEN UR QL STRIP: ABNORMAL
WBC NRBC COR # BLD AUTO: 6.83 10*3/MM3 (ref 3.4–10.8)
WHOLE BLOOD HOLD COAG: NORMAL
WHOLE BLOOD HOLD SPECIMEN: NORMAL

## 2025-05-19 PROCEDURE — 25810000003 SODIUM CHLORIDE 0.9 % SOLUTION: Performed by: NURSE PRACTITIONER

## 2025-05-19 PROCEDURE — 71045 X-RAY EXAM CHEST 1 VIEW: CPT

## 2025-05-19 PROCEDURE — 81003 URINALYSIS AUTO W/O SCOPE: CPT | Performed by: FAMILY MEDICINE

## 2025-05-19 PROCEDURE — 80053 COMPREHEN METABOLIC PANEL: CPT | Performed by: STUDENT IN AN ORGANIZED HEALTH CARE EDUCATION/TRAINING PROGRAM

## 2025-05-19 PROCEDURE — 85018 HEMOGLOBIN: CPT | Performed by: FAMILY MEDICINE

## 2025-05-19 PROCEDURE — 85014 HEMATOCRIT: CPT | Performed by: FAMILY MEDICINE

## 2025-05-19 PROCEDURE — 99222 1ST HOSP IP/OBS MODERATE 55: CPT | Performed by: FAMILY MEDICINE

## 2025-05-19 PROCEDURE — 25510000001 IOPAMIDOL 61 % SOLUTION: Performed by: STUDENT IN AN ORGANIZED HEALTH CARE EDUCATION/TRAINING PROGRAM

## 2025-05-19 PROCEDURE — 99204 OFFICE O/P NEW MOD 45 MIN: CPT | Performed by: INTERNAL MEDICINE

## 2025-05-19 PROCEDURE — 74177 CT ABD & PELVIS W/CONTRAST: CPT

## 2025-05-19 PROCEDURE — G0378 HOSPITAL OBSERVATION PER HR: HCPCS

## 2025-05-19 PROCEDURE — 80307 DRUG TEST PRSMV CHEM ANLYZR: CPT | Performed by: FAMILY MEDICINE

## 2025-05-19 PROCEDURE — 96376 TX/PRO/DX INJ SAME DRUG ADON: CPT

## 2025-05-19 PROCEDURE — 85025 COMPLETE CBC W/AUTO DIFF WBC: CPT

## 2025-05-19 PROCEDURE — 36415 COLL VENOUS BLD VENIPUNCTURE: CPT

## 2025-05-19 PROCEDURE — 96375 TX/PRO/DX INJ NEW DRUG ADDON: CPT

## 2025-05-19 PROCEDURE — 96374 THER/PROPH/DIAG INJ IV PUSH: CPT

## 2025-05-19 PROCEDURE — 84484 ASSAY OF TROPONIN QUANT: CPT | Performed by: STUDENT IN AN ORGANIZED HEALTH CARE EDUCATION/TRAINING PROGRAM

## 2025-05-19 PROCEDURE — 25010000002 ONDANSETRON PER 1 MG: Performed by: NURSE PRACTITIONER

## 2025-05-19 PROCEDURE — 85610 PROTHROMBIN TIME: CPT | Performed by: FAMILY MEDICINE

## 2025-05-19 PROCEDURE — 80074 ACUTE HEPATITIS PANEL: CPT | Performed by: FAMILY MEDICINE

## 2025-05-19 PROCEDURE — 99285 EMERGENCY DEPT VISIT HI MDM: CPT | Performed by: STUDENT IN AN ORGANIZED HEALTH CARE EDUCATION/TRAINING PROGRAM

## 2025-05-19 PROCEDURE — 93005 ELECTROCARDIOGRAM TRACING: CPT | Performed by: STUDENT IN AN ORGANIZED HEALTH CARE EDUCATION/TRAINING PROGRAM

## 2025-05-19 PROCEDURE — 71275 CT ANGIOGRAPHY CHEST: CPT

## 2025-05-19 PROCEDURE — 25010000002 PROCHLORPERAZINE 10 MG/2ML SOLUTION: Performed by: FAMILY MEDICINE

## 2025-05-19 PROCEDURE — 93005 ELECTROCARDIOGRAM TRACING: CPT

## 2025-05-19 PROCEDURE — 85730 THROMBOPLASTIN TIME PARTIAL: CPT | Performed by: FAMILY MEDICINE

## 2025-05-19 RX ORDER — PROCHLORPERAZINE EDISYLATE 5 MG/ML
10 INJECTION INTRAMUSCULAR; INTRAVENOUS EVERY 6 HOURS PRN
Status: DISCONTINUED | OUTPATIENT
Start: 2025-05-19 | End: 2025-05-20 | Stop reason: HOSPADM

## 2025-05-19 RX ORDER — ONDANSETRON 2 MG/ML
4 INJECTION INTRAMUSCULAR; INTRAVENOUS EVERY 6 HOURS PRN
Status: DISCONTINUED | OUTPATIENT
Start: 2025-05-19 | End: 2025-05-19

## 2025-05-19 RX ORDER — ACETAMINOPHEN 160 MG/5ML
650 SOLUTION ORAL EVERY 4 HOURS PRN
Status: DISCONTINUED | OUTPATIENT
Start: 2025-05-19 | End: 2025-05-20 | Stop reason: HOSPADM

## 2025-05-19 RX ORDER — PANTOPRAZOLE SODIUM 40 MG/10ML
40 INJECTION, POWDER, LYOPHILIZED, FOR SOLUTION INTRAVENOUS
Status: DISCONTINUED | OUTPATIENT
Start: 2025-05-19 | End: 2025-05-20 | Stop reason: HOSPADM

## 2025-05-19 RX ORDER — SODIUM CHLORIDE 0.9 % (FLUSH) 0.9 %
10 SYRINGE (ML) INJECTION EVERY 12 HOURS SCHEDULED
Status: DISCONTINUED | OUTPATIENT
Start: 2025-05-19 | End: 2025-05-20 | Stop reason: HOSPADM

## 2025-05-19 RX ORDER — BISACODYL 10 MG
10 SUPPOSITORY, RECTAL RECTAL DAILY PRN
Status: DISCONTINUED | OUTPATIENT
Start: 2025-05-19 | End: 2025-05-20 | Stop reason: HOSPADM

## 2025-05-19 RX ORDER — SODIUM CHLORIDE 0.9 % (FLUSH) 0.9 %
10 SYRINGE (ML) INJECTION AS NEEDED
Status: DISCONTINUED | OUTPATIENT
Start: 2025-05-19 | End: 2025-05-20 | Stop reason: HOSPADM

## 2025-05-19 RX ORDER — POLYETHYLENE GLYCOL 3350 17 G/17G
17 POWDER, FOR SOLUTION ORAL DAILY PRN
Status: DISCONTINUED | OUTPATIENT
Start: 2025-05-19 | End: 2025-05-20 | Stop reason: HOSPADM

## 2025-05-19 RX ORDER — PANTOPRAZOLE SODIUM 40 MG/10ML
80 INJECTION, POWDER, LYOPHILIZED, FOR SOLUTION INTRAVENOUS ONCE
Status: COMPLETED | OUTPATIENT
Start: 2025-05-19 | End: 2025-05-19

## 2025-05-19 RX ORDER — ONDANSETRON 2 MG/ML
4 INJECTION INTRAMUSCULAR; INTRAVENOUS EVERY 6 HOURS PRN
Status: DISCONTINUED | OUTPATIENT
Start: 2025-05-19 | End: 2025-05-20 | Stop reason: HOSPADM

## 2025-05-19 RX ORDER — AMOXICILLIN 250 MG
2 CAPSULE ORAL 2 TIMES DAILY PRN
Status: DISCONTINUED | OUTPATIENT
Start: 2025-05-19 | End: 2025-05-20 | Stop reason: HOSPADM

## 2025-05-19 RX ORDER — ONDANSETRON 2 MG/ML
4 INJECTION INTRAMUSCULAR; INTRAVENOUS ONCE
Status: COMPLETED | OUTPATIENT
Start: 2025-05-19 | End: 2025-05-19

## 2025-05-19 RX ORDER — HYDROXYZINE PAMOATE 25 MG/1
50 CAPSULE ORAL 3 TIMES DAILY PRN
Status: DISCONTINUED | OUTPATIENT
Start: 2025-05-19 | End: 2025-05-20 | Stop reason: HOSPADM

## 2025-05-19 RX ORDER — ASPIRIN 325 MG
325 TABLET ORAL ONCE
Status: DISCONTINUED | OUTPATIENT
Start: 2025-05-19 | End: 2025-05-19

## 2025-05-19 RX ORDER — ALBUTEROL SULFATE 90 UG/1
2 INHALANT RESPIRATORY (INHALATION) EVERY 4 HOURS PRN
COMMUNITY

## 2025-05-19 RX ORDER — BISACODYL 5 MG/1
5 TABLET, DELAYED RELEASE ORAL DAILY PRN
Status: DISCONTINUED | OUTPATIENT
Start: 2025-05-19 | End: 2025-05-20 | Stop reason: HOSPADM

## 2025-05-19 RX ORDER — IOPAMIDOL 612 MG/ML
100 INJECTION, SOLUTION INTRAVASCULAR
Status: COMPLETED | OUTPATIENT
Start: 2025-05-19 | End: 2025-05-19

## 2025-05-19 RX ORDER — ACETAMINOPHEN 325 MG/1
650 TABLET ORAL EVERY 4 HOURS PRN
Status: DISCONTINUED | OUTPATIENT
Start: 2025-05-19 | End: 2025-05-20 | Stop reason: HOSPADM

## 2025-05-19 RX ORDER — SODIUM CHLORIDE 9 MG/ML
40 INJECTION, SOLUTION INTRAVENOUS AS NEEDED
Status: DISCONTINUED | OUTPATIENT
Start: 2025-05-19 | End: 2025-05-20 | Stop reason: HOSPADM

## 2025-05-19 RX ORDER — ACETAMINOPHEN 650 MG/1
650 SUPPOSITORY RECTAL EVERY 4 HOURS PRN
Status: DISCONTINUED | OUTPATIENT
Start: 2025-05-19 | End: 2025-05-20 | Stop reason: HOSPADM

## 2025-05-19 RX ORDER — ALBUTEROL SULFATE 0.83 MG/ML
2.5 SOLUTION RESPIRATORY (INHALATION) EVERY 6 HOURS PRN
Status: DISCONTINUED | OUTPATIENT
Start: 2025-05-19 | End: 2025-05-20 | Stop reason: HOSPADM

## 2025-05-19 RX ADMIN — PANTOPRAZOLE SODIUM 40 MG: 40 INJECTION, POWDER, FOR SOLUTION INTRAVENOUS at 21:03

## 2025-05-19 RX ADMIN — Medication 10 ML: at 21:03

## 2025-05-19 RX ADMIN — SODIUM CHLORIDE 1000 ML: 0.9 INJECTION, SOLUTION INTRAVENOUS at 14:11

## 2025-05-19 RX ADMIN — ONDANSETRON 4 MG: 2 INJECTION INTRAMUSCULAR; INTRAVENOUS at 14:11

## 2025-05-19 RX ADMIN — PANTOPRAZOLE SODIUM 80 MG: 40 INJECTION, POWDER, FOR SOLUTION INTRAVENOUS at 14:10

## 2025-05-19 RX ADMIN — IOPAMIDOL 100 ML: 612 INJECTION, SOLUTION INTRAVENOUS at 13:03

## 2025-05-19 RX ADMIN — PROCHLORPERAZINE EDISYLATE 10 MG: 5 INJECTION INTRAMUSCULAR; INTRAVENOUS at 15:44

## 2025-05-19 NOTE — CASE MANAGEMENT/SOCIAL WORK
Discharge Planning Assessment   [place]     Patient Name: Suleiman Urena  MRN: [id number]  Today's Date: 5/19/2025    Admit Date: 5/19/2025    Plan: Patient plans to return home with significant other and her mother; no needs   Discharge Needs Assessment       Row Name 05/19/25 1457       Living Environment    People in Home significant other;other (see comments)    Name(s) of People in Home Brian Nick, Significant Other and her mother    Current Living Arrangements apartment    Duration at Residence 2 years    Potentially Unsafe Housing Conditions none    In the past 12 months has the electric, gas, oil, or water company threatened to shut off services in your home? No    Primary Care Provided by self    Provides Primary Care For no one    Family Caregiver if Needed none    Quality of Family Relationships helpful;involved    Able to Return to Prior Arrangements yes       Resource/Environmental Concerns    Resource/Environmental Concerns none    Transportation Concerns none       Transportation Needs    In the past 12 months, has lack of transportation kept you from medical appointments or from getting medications? no    In the past 12 months, has lack of transportation kept you from meetings, work, or from getting things needed for daily living? No       Food Insecurity    Within the past 12 months, you worried that your food would run out before you got the money to buy more. Never true    Within the past 12 months, the food you bought just didn't last and you didn't have money to get more. Never true       Transition Planning    Patient/Family Anticipates Transition to home with family    Patient/Family Anticipated Services at Transition none    Transportation Anticipated family or friend will provide       Discharge Needs Assessment    Readmission Within the Last 30 Days no previous admission in last 30 days    Equipment Currently Used at Home none    Concerns to be Addressed denies needs/concerns at this  time    Do you want help finding or keeping work or a job? I do not need or want help    Do you want help with school or training? For example, starting or completing job training or getting a high school diploma, GED or equivalent No    Anticipated Changes Related to Illness none    Equipment Needed After Discharge none    Provided Post Acute Provider List? N/A    Provided Post Acute Provider Quality & Resource List? N/A    Offered/Gave Vendor List no                   Discharge Plan       Row Name 05/19/25 1458       Plan    Plan Patient plans to return home with significant other and her mother; no needs    Patient/Family in Agreement with Plan yes    Provided Post Acute Provider List? N/A    Provided Post Acute Provider Quality & Resource List? N/A    Plan Comments Patient is awake and able to answer questions.  He does not have a PCP.  [facility] PCP provider directory given to patient at earlier time.  He gets his medications at [facility] and elects to enroll in Meds to Bed.  He does not use DME; denies the need for DME or services at [place].  At the time of DC the patient plans to return home with his significant other and her mother.  Questions and concerns were addressed at the time of this conversation. Will provide additional resoruces and information upon request.    Final Note Plans to DC home                    Expected Discharge Date and Time       Expected Discharge Date Expected Discharge Time    May 20, 2025            Demographic Summary       Row Name 05/19/25 1456       General Information    Admission Type observation    Arrived From emergency department    Referral Source admission list    Reason for Consult discharge planning    Preferred Language English       Contact Information    Permission Granted to Share Info With                    Functional Status       Row Name 05/19/25 1456       Functional Status    Usual Activity Tolerance excellent    Current Activity Tolerance moderate        Physical Activity    On average, how many days per week do you engage in moderate to strenuous exercise (like a brisk walk)? 0 days    On average, how many minutes do you engage in exercise at this level? 0 min    Number of minutes of exercise per week 0       Assessment of Health Literacy    How often do you have someone help you read hospital materials? Never    How often do you have problems learning about your medical condition because of difficulty understanding written information? Never    How often do you have a problem understanding what is told to you about your medical condition? Never    How confident are you filling out medical forms by yourself? Extremely    Health Literacy Excellent       Functional Status, IADL    Medications independent    Meal Preparation independent    Housekeeping independent    Laundry independent    Shopping independent    If for any reason you need help with day-to-day activities such as bathing, preparing meals, shopping, managing finances, etc., do you get the help you need? I don't need any help       Mental Status    General Appearance WDL WDL       Mental Status Summary    Recent Changes in Mental Status/Cognitive Functioning no changes                   Psychosocial       Row Name 05/19/25 0196       Values/Beliefs    Spiritual, Cultural Beliefs, Orthodox Practices, Values that Affect Care no       Behavior WDL    Behavior WDL WDL       Emotion Mood WDL    Emotion/Mood/Affect WDL WDL       Speech WDL    Speech WDL WDL       Perceptual State WDL    Perceptual State WDL WDL       Thought Process WDL    Thought Process WDL WDL       Intellectual Performance WDL    Intellectual Performance WDL WDL                   Abuse/Neglect       Row Name 05/19/25 7920       Personal Safety    Feels Unsafe at Home or Work/School no    Feels Threatened by Someone no    Does Anyone Try to Keep You From Having Contact with Others or Doing Things Outside Your Home? no    Physical Signs  of Abuse Present no                   Legal    No documentation.                  Substance Abuse       Row Name 05/19/25 1457       Substance Use    Substance Use Status current tobacco use    Last Tobacco Use Date 05/19/25  patient currently vapes                   Patient Forms    No documentation.                     Abiola Beard RN

## 2025-05-19 NOTE — CASE MANAGEMENT/SOCIAL WORK
Case Management/Social Work    Patient Name:  Suleiman Urena  YOB: 1995  MRN: [id number]  Admit Date:  5/19/2025    Noted that the patient does not have a PCP.  Spoke to patient at bedside.  Provided with [facility] provider directory and information for [facility] and [facility].  Also provided a list of extra benefits available through their Medicaid plan.  Patient is accepting of resources.    Electronically signed by:  Abiola Beard RN  05/19/25 14:29 EDT

## 2025-05-19 NOTE — ED PROVIDER NOTES
"Subjective:  History of Present Illness:    Patient is a 29-year-old male without contributing health history.  Presents to the ER today with vomiting x 3 episodes this morning.  Patient reports that there is copious amounts of blood in emesis.  Reports epigastric pain.  Denies shortness of breath.  Denies abdominal pain.  Denies changes in bowel or bladder habit.  Denies OTC medication or home remedy.  Denies alleviating or exacerbating factors    Nurses Notes reviewed and agree, including vitals, allergies, social history and prior medical history.     REVIEW OF SYSTEMS: All systems reviewed and not pertinent unless noted.  Review of Systems   Gastrointestinal:  Positive for nausea and vomiting.   All other systems reviewed and are negative.      Past Medical History:   Diagnosis Date    Deliberate self-cutting        Allergies:    Patient has no known allergies.      History reviewed. No pertinent surgical history.      Social History     Socioeconomic History    Marital status: Single   Tobacco Use    Smoking status: Never    Smokeless tobacco: Never   Vaping Use    Vaping status: Every Day   Substance and Sexual Activity    Alcohol use: Never    Drug use: Not Currently     Types: Marijuana    Sexual activity: Defer         History reviewed. No pertinent family history.    Objective  Physical Exam:  /68   Pulse 72   Temp 97.4 °F (36.3 °C)   Resp 20   Ht 185.4 cm (72.99\")   Wt 61.2 kg (134 lb 14.7 oz)   SpO2 99%   BMI 17.80 kg/m²      Physical Exam  Vitals reviewed.   Constitutional:       Appearance: He is well-developed and normal weight.   HENT:      Head: Normocephalic and atraumatic.   Eyes:      Extraocular Movements: Extraocular movements intact.      Pupils: Pupils are equal, round, and reactive to light.   Cardiovascular:      Rate and Rhythm: Normal rate and regular rhythm.   Pulmonary:      Breath sounds: Normal breath sounds.   Abdominal:      Palpations: Abdomen is soft. "   Musculoskeletal:         General: Normal range of motion.      Cervical back: Normal range of motion and neck supple.   Skin:     General: Skin is warm and dry.      Capillary Refill: Capillary refill takes less than 2 seconds.   Neurological:      General: No focal deficit present.      Mental Status: He is alert and oriented to person, place, and time.   Psychiatric:         Mood and Affect: Mood normal.         Behavior: Behavior normal.         Procedures    ED Course:    ED Course as of 05/19/25 1834   Mon May 19, 2025   1348 I have reviewed the mid-level provider(s) note and verbally discussed the case/plan of care.  I was available for consultation as needed at all times during the patient's visit in the Emergency Department.  I agree with the clinical impression, plan, and disposition unless otherwise stated in the MDM below.    ATTENDING ATTESTATION  HPI: 29 y.o. male who presents with vomiting blood x 1 since this morning. Associated chest pain. No medical hx. Denies ETOH.    MDM: ED workup reviewed.    EKG per my interpretation normal sinus rhythm, rate 76, normal axis, no STEMI, normal QRS QTC intervals.    I have reviewed the labs results. Clinically unremarkable.    Radiology reports reviewed.     CT Abdomen Pelvis With Contrast  Result Date: 5/19/2025  Periportal edema. Consider possible hepatitis.     CT Angiogram Chest Pulmonary Embolism  Result Date: 5/19/2025  No evidence of pulmonary embolus or dissection.     XR Chest 1 View  Result Date: 5/19/2025  No acute cardiopulmonary process.     I recommended consultation with gastroenterology. [JS]      ED Course User Index  [JS] Zbigniew Greco DO       Lab Results (last 24 hours)       Procedure Component Value Units Date/Time    CBC & Differential [[id number]]  (Normal) Collected: 05/19/25 1141    Specimen: Blood Updated: 05/19/25 1149    Narrative:      The following orders were created for panel order CBC & Differential.  Procedure                                Abnormality         Status                     ---------                               -----------         ------                     CBC Auto Differential[[id number]]        Normal              Final result                 Please view results for these tests on the individual orders.    Comprehensive Metabolic Panel [[id number]] Collected: 05/19/25 1141    Specimen: Blood Updated: 05/19/25 1209     Glucose 94 mg/dL      BUN 15 mg/dL      Creatinine 0.95 mg/dL      Sodium 137 mmol/L      Potassium 4.0 mmol/L      Chloride 102 mmol/L      CO2 24.7 mmol/L      Calcium 9.5 mg/dL      Total Protein 7.2 g/dL      Albumin 4.5 g/dL      ALT (SGPT) 10 U/L      AST (SGOT) 13 U/L      Alkaline Phosphatase 44 U/L      Total Bilirubin 0.4 mg/dL      Globulin 2.7 gm/dL      A/G Ratio 1.7 g/dL      BUN/Creatinine Ratio 15.8     Anion Gap 10.3 mmol/L      eGFR 112.5 mL/min/1.73     Narrative:      GFR Categories in Chronic Kidney Disease (CKD)              GFR Category          GFR (mL/min/1.73)    Interpretation  G1                    90 or greater        Normal or high (1)  G2                    60-89                Mild decrease (1)  G3a                   45-59                Mild to moderate decrease  G3b                   30-44                Moderate to severe decrease  G4                    15-29                Severe decrease  G5                    14 or less           Kidney failure    (1)In the absence of evidence of kidney disease, neither GFR category G1 or G2 fulfill the criteria for CKD.    eGFR calculation 2021 CKD-EPI creatinine equation, which does not include race as a factor    High Sensitivity Troponin T [[id number]]  (Normal) Collected: 05/19/25 1141    Specimen: Blood Updated: 05/19/25 1209     HS Troponin T <6 ng/L     Narrative:      High Sensitive Troponin T Reference Range:  <14.0 ng/L- Negative Female for AMI  <22.0 ng/L- Negative Male for AMI  >=14 - Abnormal Female indicating possible  myocardial injury.  >=22 - Abnormal Male indicating possible myocardial injury.   Clinicians would have to utilize clinical acumen, EKG, Troponin, and serial changes to determine if it is an Acute Myocardial Infarction or myocardial injury due to an underlying chronic condition.         CBC Auto Differential [[id number]]  (Normal) Collected: 05/19/25 1141    Specimen: Blood Updated: 05/19/25 1149     WBC 6.83 10*3/mm3      RBC 4.43 10*6/mm3      Hemoglobin 14.1 g/dL      Hematocrit 40.5 %      MCV 91.4 fL      MCH 31.8 pg      MCHC 34.8 g/dL      RDW 12.6 %      RDW-SD 42.6 fl      MPV 9.8 fL      Platelets 294 10*3/mm3      Neutrophil % 64.9 %      Lymphocyte % 24.5 %      Monocyte % 8.5 %      Eosinophil % 0.3 %      Basophil % 1.5 %      Immature Grans % 0.3 %      Neutrophils, Absolute 4.44 10*3/mm3      Lymphocytes, Absolute 1.67 10*3/mm3      Monocytes, Absolute 0.58 10*3/mm3      Eosinophils, Absolute 0.02 10*3/mm3      Basophils, Absolute 0.10 10*3/mm3      Immature Grans, Absolute 0.02 10*3/mm3      nRBC 0.0 /100 WBC     Protime-INR [[id number]]  (Abnormal) Collected: 05/19/25 1141    Specimen: Blood Updated: 05/19/25 1418     Protime 15.8 Seconds      INR 1.18    Narrative:      Suggested INR therapeutic range for stable oral anticoagulant therapy:    Low Intensity therapy:   1.5-2.0  Moderate Intensity therapy:   2.0-3.0  High Intensity therapy:   2.5-4.0    aPTT [[id number]]  (Normal) Collected: 05/19/25 1141    Specimen: Blood Updated: 05/19/25 1418     PTT 31.6 seconds     Hepatitis Panel, Acute [[id number]] Collected: 05/19/25 1141    Specimen: Blood Updated: 05/19/25 1608    High Sensitivity Troponin T 1Hr [[id number]] Collected: 05/19/25 1307    Specimen: Blood Updated: 05/19/25 1338     HS Troponin T <6 ng/L      Troponin T Numeric Delta --     Comment: Unable to calculate.       Narrative:      High Sensitive Troponin T Reference Range:  <14.0 ng/L- Negative Female for AMI  <22.0 ng/L- Negative Male  for AMI  >=14 - Abnormal Female indicating possible myocardial injury.  >=22 - Abnormal Male indicating possible myocardial injury.   Clinicians would have to utilize clinical acumen, EKG, Troponin, and serial changes to determine if it is an Acute Myocardial Infarction or myocardial injury due to an underlying chronic condition.         Hemoglobin & Hematocrit, Blood [[id number]]  (Normal) Collected: 05/19/25 1820    Specimen: Blood Updated: 05/19/25 1825     Hemoglobin 13.2 g/dL      Hematocrit 39.1 %              CT Abdomen Pelvis With Contrast  Result Date: 5/19/2025  PROCEDURE: CT ABDOMEN PELVIS W CONTRAST-  HISTORY: Hematemesis, abdominal pain  COMPARISON:  None .  TECHNIQUE: Multiple axial CT images were obtained from the lung bases through the pubic symphysis following the administration of Isovue 300 contrast.  FINDINGS:  ABDOMEN: Paucity of intra-abdominal fat decreases exam sensitivity. The liver is normal. There is mild periportal edema. Consider hepatitis in the differential. The gallbladder is present and appears unremarkable. The spleen is unremarkable. No adrenal mass is present.  The pancreas is normal. The kidneys are normal. The aorta is normal in caliber. There is no free fluid or adenopathy. The abdominal portions of the GI tract are unremarkable with no evidence of obstruction.  PELVIS: The appendix is not identified.  The urinary bladder is unremarkable. Prostate is normal in size. There is a least 1 calcification in the prostate. There is no significant free fluid or adenopathy.      Impression: Periportal edema. Consider possible hepatitis.   CTDI: 3.24 mGy DLP: 169.28 mGy.cm   This study was performed with techniques to keep radiation doses as low as reasonably achievable (ALARA). Individualized dose reduction techniques using automated exposure control or adjustment of mA and/or kV according to the patient size were employed.      Images were reviewed, interpreted, and dictated by Dr. Ireland  MD Froy Transcribed by Annalee Hughes PA-C.  This report was signed and finalized on 5/19/2025 1:30 PM by Shu Mcduffie MD.      CT Angiogram Chest Pulmonary Embolism  Result Date: 5/19/2025  PROCEDURE: CT ANGIOGRAM CHEST PULMONARY EMBOLISM-  HISTORY: Hematemesis, rule out PE  COMPARISON: None .  TECHNIQUE: Multiple axial CT images were obtained from the thoracic inlet through the upper abdomen following the administration of Isovue 300 per the CT PE protocol. Coronal and oblique 3D MIP images were reconstructed from the original axial data set.  FINDINGS: Motion artifact limits the exam. There are no filling defects within the pulmonary arteries to suggest an embolus. The thoracic aorta is normal in caliber with no evidence of dissection. The heart is normal in size. There are no pleural or pericardial effusions. There is no adenopathy.  Lung windows reveal no focal opacities or suspicious pulmonary nodules.      Impression: No evidence of pulmonary embolus or dissection.   DLP: 169.28 mGy.cm CTDI: 3.24 mGy   This study was performed with techniques to keep radiation doses as low as reasonably achievable (ALARA). Individualized dose reduction techniques using automated exposure control or adjustment of mA and/or kV according to the patient size were employed.   Images were reviewed, interpreted, and dictated by Dr. Shu Mcduffie MD Transcribed by Annalee Hughes PA-C.  This report was signed and finalized on 5/19/2025 1:30 PM by Shu Mcduffie MD.      XR Chest 1 View  Result Date: 5/19/2025  PROCEDURE: XR CHEST 1 VW-  HISTORY: Chest Pain Triage Protocol, [place] chest pain, shortness of breath in all over chest numbness today.  COMPARISON: None.  FINDINGS: The heart is normal in size. The lungs are clear. The mediastinum is unremarkable. There is no pneumothorax.  There are no acute osseous abnormalities. Apical lordotic positioning noted.      Impression: No acute cardiopulmonary process.     This report was  signed and finalized on 5/19/2025 11:59 AM by Shu Mcduffie MD.           MDM     Amount and/or Complexity of Data Reviewed  Clinical lab tests: reviewed  Tests in the radiology section of CPT®: reviewed  Tests in the medicine section of CPT®: reviewed        Initial impression of presenting illness: Patient is a 29-year-old male without contributing health history.  Presents to the ER today with vomiting x 3 episodes this morning.  Patient reports that there is copious amounts of blood in emesis.  Reports epigastric pain.  Denies shortness of breath.  Denies abdominal pain.  Denies changes in bowel or bladder habit.  Denies OTC medication or home remedy.  Denies alleviating or exacerbating factors    DDX: includes but is not limited to: Peptic ulcer disease, gastritis, or other    Patient arrives stable with vitals interpreted by myself.     Pertinent features from physical exam: Lung sounds are clear bilaterally throughout.  Soft nontender.  Bowel sounds normal.  Heart sounds normal..    Initial diagnostic plan: CBC, CMP, PT/INR, troponin, EKG, CT abdomen pelvis, CT PE protocol, chest x-ray    Results from initial plan were reviewed and interpreted by me revealing CBC, CMP, troponin was all within appropriate range.  PT/INR mildly elevated 15.8 and 1. 18.  EKG was 76 bpm no elevation ST segment.  No depression noted.  Chest x-ray with follow impression.  No acute cardiopulmonary process.  CT of abdomen pelvis with following impression.  Periportal edema.  Consider possible hepatitis CT PE protocol with the following impression.  No evidence of pulmonary embolus or dissection.    Diagnostic information from other sources: Chart review    Interventions / Re-evaluation: Vital signs stable throughout encounter.    Results/clinical rationale were discussed with patient    Consultations/Discussion of results with other physicians: Spoke with Dr. cruz.  His recommendation was Protonix and to admit EGD tomorrow.  Spoke  with Dr. Lynn and he is agreeable to admit this patient.    Disposition plan: Admit patient to the hospital.  -----  [facility] reviewed by Katie Lynn MD, Zbigniew Greco DO     Final diagnoses:   Hematemesis, unspecified whether nausea present          Dhiraj Rodríguez, APRN  05/19/25 1833

## 2025-05-19 NOTE — H&P (VIEW-ONLY)
In Patient Consult      Date of Consultation: May 19, 2025  Patient Name: Suleiman Urena  MRN: [id number]  : 1995     Referring provider: Katie Lynn MD    Primary care provider:  Provider, No Known    Reason for consultation: Nausea vomiting coffee-ground emesis    History of Present Illness: This is a 29-year-old young male patient without any significant medical history was brought in the emergency room today on 2025 with complaints of nausea vomiting and coffee-ground emesis.    He states that he woke up this morning with mid abdominal cramps and later on he felt some numbness in the lower chest with the chest pain and started throwing up.  She had at least 3-4 times vomiting with a minimal amount of bile.  Mixed with the streak of red blood.  Denies any significant amount of bleeding or clots.  He denies any current abdominal pain.  No melena.  He admits that he smokes marijuana delta 8 daily for the past few years.  He also vapes daily.  He also takes ibuprofen occasionally for headache..  No prior peptic ulcer disease, EGD colonoscopy.  Denies alcohol abuse.      Lab work done in the emergency room revealed a normal CMP.  His INR was 1.18.  CBC revealed normal hemoglobin of 14 WBC normal 6.8.  Platelet count normal 294,000.  CT abdomen pelvis with contrast revealed periportal edema consider possible hepatitis..  CT PE protocol negative.    Subjective     Past Medical History:   Diagnosis Date    Deliberate self-cutting        History reviewed. No pertinent surgical history.    History reviewed. No pertinent family history.    Social History     Socioeconomic History    Marital status: Single   Tobacco Use    Smoking status: Never    Smokeless tobacco: Never   Vaping Use    Vaping status: Every Day   Substance and Sexual Activity    Alcohol use: Never    Drug use: Not Currently     Types: Marijuana    Sexual activity: Defer         Current Facility-Administered Medications:      acetaminophen (TYLENOL) tablet 650 mg, 650 mg, Oral, Q4H PRN **OR** acetaminophen (TYLENOL) 160 MG/5ML oral solution 650 mg, 650 mg, Oral, Q4H PRN **OR** acetaminophen (TYLENOL) suppository 650 mg, 650 mg, Rectal, Q4H PRN, Katie Lynn MD    albuterol (PROVENTIL) nebulizer solution 0.083% 2.5 mg/3mL, 2.5 mg, Nebulization, Q6H PRN, Katie Lynn MD    sennosides-docusate (PERICOLACE) 8.6-50 MG per tablet 2 tablet, 2 tablet, Oral, BID PRN **AND** polyethylene glycol (MIRALAX) packet 17 g, 17 g, Oral, Daily PRN **AND** bisacodyl (DULCOLAX) EC tablet 5 mg, 5 mg, Oral, Daily PRN **AND** bisacodyl (DULCOLAX) suppository 10 mg, 10 mg, Rectal, Daily PRN, Katie Lynn MD    hydrOXYzine pamoate (VISTARIL) capsule 50 mg, 50 mg, Oral, TID PRN, Katie Lynn MD    Magnesium Standard Dose Replacement - Follow Nurse / BPA Driven Protocol, , Not Applicable, PRN, Katie Lynn MD    melatonin tablet 5 mg, 5 mg, Oral, Nightly PRN, Katie Lynn MD    ondansetron (ZOFRAN) injection 4 mg, 4 mg, Intravenous, Q6H PRN, Katie Lynn MD    pantoprazole (PROTONIX) injection 40 mg, 40 mg, Intravenous, BID AC, Katie Lynn MD    Potassium Replacement - Follow Nurse / BPA Driven Protocol, , Not Applicable, PRN, Katie Lynn MD    prochlorperazine (COMPAZINE) injection 10 mg, 10 mg, Intravenous, Q6H PRN, Katie Lynn MD, 10 mg at 05/19/25 1544    sodium chloride 0.9 % flush 10 mL, 10 mL, Intravenous, PRN, Katie Lynn MD    sodium chloride 0.9 % flush 10 mL, 10 mL, Intravenous, Q12H, Katie Lynn MD    sodium chloride 0.9 % flush 10 mL, 10 mL, Intravenous, PRN, Katie Lynn MD    sodium chloride 0.9 % infusion 40 mL, 40 mL, Intravenous, PRN, Katie Lynn MD    No Known Allergies    Review of Systems   Constitutional:  Negative for appetite change, fatigue, fever and unexpected weight loss.   HENT:  Negative for trouble swallowing.    Gastrointestinal:  Positive for nausea and vomiting. Negative  "for abdominal distention, abdominal pain, anal bleeding, blood in stool, constipation, diarrhea, rectal pain, GERD and indigestion.        Blood in the vomitus        The following portions of the patient's history were reviewed and updated as appropriate: allergies, current medications, past family history, past medical history, past social history, past surgical history and problem list.    Objective     Vitals:    05/19/25 1330 05/19/25 1400 05/19/25 1501 05/19/25 1936   BP: 106/65 98/64 125/68 103/58   BP Location:    Right arm   Patient Position:    Lying   Pulse: 72 77 72 60   Resp:   20 18   Temp:   97.4 °F (36.3 °C) 97.5 °F (36.4 °C)   TempSrc:    Oral   SpO2: 98% 97% 99% 96%   Weight:   61.2 kg (134 lb 14.7 oz)    Height:   185.4 cm (72.99\")        Physical Exam  Constitutional:       Comments: Poorly built and nourished   HENT:      Head: Normocephalic and atraumatic.   Eyes:      Conjunctiva/sclera: Conjunctivae normal.   Abdominal:      General: Abdomen is flat. There is no distension.      Palpations: There is no mass.      Tenderness: There is no abdominal tenderness. There is no guarding or rebound.      Hernia: No hernia is present.   Musculoskeletal:      Cervical back: Normal range of motion and neck supple.   Neurological:      Mental Status: He is alert.         Results from last 7 days   Lab Units 05/19/25  1820 05/19/25  1141   SODIUM mmol/L  --  137   POTASSIUM mmol/L  --  4.0   CHLORIDE mmol/L  --  102   CO2 mmol/L  --  24.7   BUN mg/dL  --  15   CREATININE mg/dL  --  0.95   CALCIUM mg/dL  --  9.5   ALBUMIN g/dL  --  4.5   BILIRUBIN mg/dL  --  0.4   ALK PHOS U/L  --  44   ALT (SGPT) U/L  --  10   AST (SGOT) U/L  --  13   GLUCOSE mg/dL  --  94   WBC 10*3/mm3  --  6.83   HEMOGLOBIN g/dL 13.2 14.1   PLATELETS 10*3/mm3  --  294   INR   --  1.18*       Imaging Results (Last 24 Hours)       Procedure Component Value Units Date/Time    CT Angiogram Chest Pulmonary Embolism [[id number]] Collected: " 05/19/25 1328     Updated: 05/19/25 1332    Narrative:      PROCEDURE: CT ANGIOGRAM CHEST PULMONARY EMBOLISM-     HISTORY: Hematemesis, rule out PE     COMPARISON: None .     TECHNIQUE: Multiple axial CT images were obtained from the thoracic  inlet through the upper abdomen following the administration of Isovue  300 per the CT PE protocol. Coronal and oblique 3D MIP images were  reconstructed from the original axial data set.     FINDINGS: Motion artifact limits the exam. There are no filling defects  within the pulmonary arteries to suggest an embolus. The thoracic aorta  is normal in caliber with no evidence of dissection. The heart is normal  in size. There are no pleural or pericardial effusions. There is no  adenopathy.  Lung windows reveal no focal opacities or suspicious  pulmonary nodules.       Impression:      No evidence of pulmonary embolus or dissection.        DLP: 169.28 mGy.cm  CTDI: 3.24 mGy        This study was performed with techniques to keep radiation doses as low  as reasonably achievable (ALARA). Individualized dose reduction  techniques using automated exposure control or adjustment of mA and/or  kV according to the patient size were employed.        Images were reviewed, interpreted, and dictated by Dr. Shu Mcduffie MD  Transcribed by Annalee Hughes PA-C.     This report was signed and finalized on 5/19/2025 1:30 PM by Shu Mcduffie MD.       CT Abdomen Pelvis With Contrast [[id number]] Collected: 05/19/25 1330     Updated: 05/19/25 1332    Narrative:      PROCEDURE: CT ABDOMEN PELVIS W CONTRAST-     HISTORY: Hematemesis, abdominal pain     COMPARISON:  None .     TECHNIQUE: Multiple axial CT images were obtained from the lung bases  through the pubic symphysis following the administration of Isovue 300  contrast.     FINDINGS:     ABDOMEN: Paucity of intra-abdominal fat decreases exam sensitivity. The  liver is normal. There is mild periportal edema. Consider hepatitis in  the  differential. The gallbladder is present and appears unremarkable.  The spleen is unremarkable. No adrenal mass is present.  The pancreas is  normal. The kidneys are normal. The aorta is normal in caliber. There is  no free fluid or adenopathy. The abdominal portions of the GI tract are  unremarkable with no evidence of obstruction.     PELVIS: The appendix is not identified.  The urinary bladder is  unremarkable. Prostate is normal in size. There is a least 1  calcification in the prostate. There is no significant free fluid or  adenopathy.       Impression:      Periportal edema. Consider possible hepatitis.        CTDI: 3.24 mGy  DLP: 169.28 mGy.cm        This study was performed with techniques to keep radiation doses as low  as reasonably achievable (ALARA). Individualized dose reduction  techniques using automated exposure control or adjustment of mA and/or  kV according to the patient size were employed.                 Images were reviewed, interpreted, and dictated by Dr. Shu Mcduffie MD  Transcribed by Annalee Hughes PA-C.     This report was signed and finalized on 5/19/2025 1:30 PM by Shu Mcduffie MD.       XR Chest 1 View [[id number]] Collected: 05/19/25 1158     Updated: 05/19/25 1201    Narrative:      PROCEDURE: XR CHEST 1 VW-     HISTORY: Chest Pain Triage Protocol, Center chest pain, shortness of  breath in all over chest numbness today.     COMPARISON: None.     FINDINGS: The heart is normal in size. The lungs are clear. The  mediastinum is unremarkable. There is no pneumothorax.  There are no  acute osseous abnormalities. Apical lordotic positioning noted.        Impression:      No acute cardiopulmonary process.              This report was signed and finalized on 5/19/2025 11:59 AM by Shu Mcduffie MD.               Assessment / Plan      Assessment/Recommendations:     Nausea vomiting  Coffee-ground emesis  Daily vaping  Marijuana dependence    Patient history is concerning for cyclical  vomiting associated with the marijuana use.  Patient has been getting these episodes once in few weeks for the last few years.  It gets better by itself.  This time he had some red blood mixed that is why he came to emergency room.  Admission labs and CT scans were reviewed.    Patient likely had a minimal mucosal bleeding from likely erosive esophagitis versus gastric erosion.  No history suggestive any overt GI bleeding.  Patient has been taking ibuprofen intermittently however no convincing signs of peptic ulcer disease.  Patient needs an EGD for further evaluation.  I have discussed the risk and benefits involved and patient is agreeable to proceed with the procedure.      N.p.o. after midnight  Antiemetics as appropriate  IV Protonix twice daily  Acute hepatitis panel  Counseling on abstinence from smoking marijuana and vaping  Scheduled for an EGD in a.m.  Monitor for any overt GI bleeding      Thank you very much for letting me participate in the care of this patient.  Please do not hesitate to call me if you have any questions.    Nicky Taylor MD  [facility]  5/19/2025  19:45 EDT    Please note that portions of this note may have been completed with a voice recognition program.

## 2025-05-19 NOTE — H&P
[facility]   HISTORY AND PHYSICAL      Name:  Suleiman Urena   Age:  29 y.o.  Sex:  male  :  1995  MRN:  [id number]   Visit Number:  [id number]  Admission Date:  2025  Date Of Service:  25  Primary Care Physician:  Provider, No Known    Chief Complaint:     Vomiting blood    History Of Presenting Illness:      Patient is a 29 years old male with no significant past medical history who presented to the ER with a chief complaint of vomiting blood.  Patient reports that he woke up this morning and started having pain in his chest then felt nauseated and started throwing up, he noticed bright red blood streaks along with mucus coming up his vomit. Patient reports that he has been having those episodes of vomiting for years now and they come and go but he never was evaluated for it.  Patient reports that he took ibuprofen yesterday, he usually takes ibuprofen for headaches, but denies any excessive use of ibuprofen recently.  He vapes and reporting THC use.     On ER evaluation, Vitals were stable, afebrile, room air.  His workup in the ER was with 2 negative troponins, CBC and CMP nonactionable.  INR 1.18.  Chest x-ray with no acute process.  CTA chest with no evidence of PE or dissection.  CT abdomen pelvis with contrast showed periportal edema, consider possible hepatitis.  Case discussed with GI Dr. Harrell, recommended admission, PPI and will consult on the patient for EGD tomorrow.  Hospitalist consulted for admission, further management and treatment.    Review Of Systems:    All systems were reviewed and negative except as mentioned in history of presenting illness, assessment and plan.    Past Medical History: Patient  has a past medical history of Deliberate self-cutting.    Past Surgical History: Patient  has no past surgical history on file.    Social History: Patient  reports that he has never smoked. He has never used smokeless tobacco. He reports that he does  "not currently use drugs after having used the following drugs: Marijuana. He reports that he does not drink alcohol.    Family History:  Patient's family history has been reviewed and found to be noncontributory.     Allergies:      Patient has no known allergies.    Home Medications:    Prior to Admission Medications       None          ED Medications:    Medications   sodium chloride 0.9 % flush 10 mL (has no administration in time range)   sodium chloride 0.9 % bolus 1,000 mL (has no administration in time range)   ondansetron (ZOFRAN) injection 4 mg (has no administration in time range)   pantoprazole (PROTONIX) injection 80 mg (has no administration in time range)   iopamidol (ISOVUE-300) 61 % injection 100 mL (100 mL Intravenous Given 5/19/25 1303)     Vital Signs:  Temp:  [97.6 °F (36.4 °C)] 97.6 °F (36.4 °C)  Heart Rate:  [72-82] 77  Resp:  [20] 20  BP: ()/(64-69) 98/64        05/19/25  1131   Weight: 52.2 kg (115 lb)     Body mass index is 15.17 kg/m².    Physical Exam:     Most recent vital Signs: BP 98/64   Pulse 77   Temp 97.6 °F (36.4 °C) (Oral)   Resp 20   Ht 185.4 cm (73\")   Wt 52.2 kg (115 lb)   SpO2 97%   BMI 15.17 kg/m²     Physical Exam  Vitals and nursing note reviewed.   Constitutional:       General: He is not in acute distress.  HENT:      Head: Normocephalic and atraumatic.      Nose: Nose normal.      Mouth/Throat:      Mouth: Mucous membranes are moist.   Eyes:      Extraocular Movements: Extraocular movements intact.      Conjunctiva/sclera: Conjunctivae normal.      Pupils: Pupils are equal, round, and reactive to light.   Cardiovascular:      Rate and Rhythm: Normal rate and regular rhythm.      Pulses: Normal pulses.      Heart sounds: Normal heart sounds.   Pulmonary:      Effort: Pulmonary effort is normal.      Breath sounds: Normal breath sounds. No wheezing or rhonchi.   Abdominal:      General: Bowel sounds are normal. There is no distension.      Palpations: Abdomen " "is soft.      Tenderness: There is no abdominal tenderness. There is no guarding or rebound.   Musculoskeletal:         General: Normal range of motion.      Cervical back: Normal range of motion and neck supple.      Right lower leg: No edema.      Left lower leg: No edema.   Skin:     General: Skin is warm and dry.      Findings: No rash.   Neurological:      General: No focal deficit present.      Mental Status: He is alert and oriented to person, place, and time. Mental status is at baseline.   Psychiatric:         Mood and Affect: Mood normal.         Behavior: Behavior normal.         Thought Content: Thought content normal.         Laboratory data:    I have reviewed the labs done in the emergency room.    Results from last 7 days   Lab Units 05/19/25  1141   SODIUM mmol/L 137   POTASSIUM mmol/L 4.0   CHLORIDE mmol/L 102   CO2 mmol/L 24.7   BUN mg/dL 15   CREATININE mg/dL 0.95   CALCIUM mg/dL 9.5   BILIRUBIN mg/dL 0.4   ALK PHOS U/L 44   ALT (SGPT) U/L 10   AST (SGOT) U/L 13   GLUCOSE mg/dL 94     Results from last 7 days   Lab Units 05/19/25  1141   WBC 10*3/mm3 6.83   HEMOGLOBIN g/dL 14.1   HEMATOCRIT % 40.5   PLATELETS 10*3/mm3 294         Results from last 7 days   Lab Units 05/19/25  1307 05/19/25  1141   HSTROP T ng/L <6 <6                           Invalid input(s): \"USDES\", \"NITRITITE\", \"BACT\", \"EP\"    Pain Management Panel          Latest Ref Rng & Units 1/14/2025   Pain Management Panel   Amphetamine, Urine Qual Negative Negative    Barbiturates Screen, Urine Negative Negative    Benzodiazepine Screen, Urine Negative Negative    Buprenorphine, Screen, Urine Negative Negative    Cocaine Screen, Urine Negative Negative    Fentanyl, Urine Negative Negative    Methadone Screen , Urine Negative Negative    Methamphetamine, Ur Negative Negative        EKG:      Sinus rhythm, heart rate 76, normal axis, nonspecific ST/T wave changes.    Radiology:    CT Abdomen Pelvis With Contrast  Result Date: " 5/19/2025  PROCEDURE: CT ABDOMEN PELVIS W CONTRAST-  HISTORY: Hematemesis, abdominal pain  COMPARISON:  None .  TECHNIQUE: Multiple axial CT images were obtained from the lung bases through the pubic symphysis following the administration of Isovue 300 contrast.  FINDINGS:  ABDOMEN: Paucity of intra-abdominal fat decreases exam sensitivity. The liver is normal. There is mild periportal edema. Consider hepatitis in the differential. The gallbladder is present and appears unremarkable. The spleen is unremarkable. No adrenal mass is present.  The pancreas is normal. The kidneys are normal. The aorta is normal in caliber. There is no free fluid or adenopathy. The abdominal portions of the GI tract are unremarkable with no evidence of obstruction.  PELVIS: The appendix is not identified.  The urinary bladder is unremarkable. Prostate is normal in size. There is a least 1 calcification in the prostate. There is no significant free fluid or adenopathy.      Periportal edema. Consider possible hepatitis.   CTDI: 3.24 mGy DLP: 169.28 mGy.cm   This study was performed with techniques to keep radiation doses as low as reasonably achievable (ALARA). Individualized dose reduction techniques using automated exposure control or adjustment of mA and/or kV according to the patient size were employed.      Images were reviewed, interpreted, and dictated by Dr. Shu Mcduffie MD Transcribed by Annalee Hughes PA-C.  This report was signed and finalized on 5/19/2025 1:30 PM by Shu Mcduffie MD.      CT Angiogram Chest Pulmonary Embolism  Result Date: 5/19/2025  PROCEDURE: CT ANGIOGRAM CHEST PULMONARY EMBOLISM-  HISTORY: Hematemesis, rule out PE  COMPARISON: None .  TECHNIQUE: Multiple axial CT images were obtained from the thoracic inlet through the upper abdomen following the administration of Isovue 300 per the CT PE protocol. Coronal and oblique 3D MIP images were reconstructed from the original axial data set.  FINDINGS: Motion  artifact limits the exam. There are no filling defects within the pulmonary arteries to suggest an embolus. The thoracic aorta is normal in caliber with no evidence of dissection. The heart is normal in size. There are no pleural or pericardial effusions. There is no adenopathy.  Lung windows reveal no focal opacities or suspicious pulmonary nodules.      No evidence of pulmonary embolus or dissection.   DLP: 169.28 mGy.cm CTDI: 3.24 mGy   This study was performed with techniques to keep radiation doses as low as reasonably achievable (ALARA). Individualized dose reduction techniques using automated exposure control or adjustment of mA and/or kV according to the patient size were employed.   Images were reviewed, interpreted, and dictated by Dr. Shu Mcduffie MD Transcribed by Annalee Hughes PA-C.  This report was signed and finalized on 5/19/2025 1:30 PM by Shu Mcduffie MD.      XR Chest 1 View  Result Date: 5/19/2025  PROCEDURE: XR CHEST 1 VW-  HISTORY: Chest Pain Triage Protocol, Center chest pain, shortness of breath in all over chest numbness today.  COMPARISON: None.  FINDINGS: The heart is normal in size. The lungs are clear. The mediastinum is unremarkable. There is no pneumothorax.  There are no acute osseous abnormalities. Apical lordotic positioning noted.      No acute cardiopulmonary process.     This report was signed and finalized on 5/19/2025 11:59 AM by Shu Mcduffie MD.        Assessment:    Hematemesis, POA  THC use    Plan:    Patient is admitted for further management and treatment.    Hematemesis  - Gastroenterology Dr. Harrell consulted, appreciate recommendations.  - Plan for EGD in a.m.  - Clear liquid diet today, n.p.o. after midnight  - Monitor hemoglobin and transfuse as indicated to keep hemoglobin of 7  - Continue PPI  - Avoid NSAIDs  - CT abdomen pelvis with contrast showed periportal edema, consider possible hepatitis.   - Will check hepatitis panel  - Urinalysis and UDS  pending.    -Continue home meds as warranted.  -Further orders as indicated per clinical course.    Risk Assessment: Moderate to high  DVT Prophylaxis: SCDs, avoid chemoprophylaxis with GI bleed  Code Status: Full  Diet: Clears, n.p.o. after midnight    Advance Care Planning   ACP discussion was held with the patient during this visit. Patient does not have an advance directive, information provided.       Katie Lynn MD  05/19/25  14:06 EDT    Dictated utilizing Dragon dictation.

## 2025-05-19 NOTE — PLAN OF CARE
Goal Outcome Evaluation:  Plan of Care Reviewed With: patient, family        Progress: no change  Outcome Evaluation: New admission from ED for hematemesis. VSS, Pt. did have nausea which was effectively controlled with PRN compazine. Tolerating clear liquids. NPO after MN with possible EGD tomorrow. POC ongoing.

## 2025-05-19 NOTE — CONSULTS
In Patient Consult      Date of Consultation: May 19, 2025  Patient Name: Suleiman Urena  MRN: [id number]  : 1995     Referring provider: Katie Lynn MD    Primary care provider:  Provider, No Known    Reason for consultation: Nausea vomiting coffee-ground emesis    History of Present Illness: This is a 29-year-old young male patient without any significant medical history was brought in the emergency room today on 2025 with complaints of nausea vomiting and coffee-ground emesis.    He states that he woke up this morning with mid abdominal cramps and later on he felt some numbness in the lower chest with the chest pain and started throwing up.  She had at least 3-4 times vomiting with a minimal amount of bile.  Mixed with the streak of red blood.  Denies any significant amount of bleeding or clots.  He denies any current abdominal pain.  No melena.  He admits that he smokes marijuana delta 8 daily for the past few years.  He also vapes daily.  He also takes ibuprofen occasionally for headache..  No prior peptic ulcer disease, EGD colonoscopy.  Denies alcohol abuse.      Lab work done in the emergency room revealed a normal CMP.  His INR was 1.18.  CBC revealed normal hemoglobin of 14 WBC normal 6.8.  Platelet count normal 294,000.  CT abdomen pelvis with contrast revealed periportal edema consider possible hepatitis..  CT PE protocol negative.    Subjective     Past Medical History:   Diagnosis Date    Deliberate self-cutting        History reviewed. No pertinent surgical history.    History reviewed. No pertinent family history.    Social History     Socioeconomic History    Marital status: Single   Tobacco Use    Smoking status: Never    Smokeless tobacco: Never   Vaping Use    Vaping status: Every Day   Substance and Sexual Activity    Alcohol use: Never    Drug use: Not Currently     Types: Marijuana    Sexual activity: Defer         Current Facility-Administered Medications:      acetaminophen (TYLENOL) tablet 650 mg, 650 mg, Oral, Q4H PRN **OR** acetaminophen (TYLENOL) 160 MG/5ML oral solution 650 mg, 650 mg, Oral, Q4H PRN **OR** acetaminophen (TYLENOL) suppository 650 mg, 650 mg, Rectal, Q4H PRN, Katie Lynn MD    albuterol (PROVENTIL) nebulizer solution 0.083% 2.5 mg/3mL, 2.5 mg, Nebulization, Q6H PRN, Katie Lynn MD    sennosides-docusate (PERICOLACE) 8.6-50 MG per tablet 2 tablet, 2 tablet, Oral, BID PRN **AND** polyethylene glycol (MIRALAX) packet 17 g, 17 g, Oral, Daily PRN **AND** bisacodyl (DULCOLAX) EC tablet 5 mg, 5 mg, Oral, Daily PRN **AND** bisacodyl (DULCOLAX) suppository 10 mg, 10 mg, Rectal, Daily PRN, Katie Lynn MD    hydrOXYzine pamoate (VISTARIL) capsule 50 mg, 50 mg, Oral, TID PRN, Katie Lynn MD    Magnesium Standard Dose Replacement - Follow Nurse / BPA Driven Protocol, , Not Applicable, PRN, Katie Lynn MD    melatonin tablet 5 mg, 5 mg, Oral, Nightly PRN, Katie Lynn MD    ondansetron (ZOFRAN) injection 4 mg, 4 mg, Intravenous, Q6H PRN, Katie Lynn MD    pantoprazole (PROTONIX) injection 40 mg, 40 mg, Intravenous, BID AC, Katie Lynn MD    Potassium Replacement - Follow Nurse / BPA Driven Protocol, , Not Applicable, PRN, Katie Lynn MD    prochlorperazine (COMPAZINE) injection 10 mg, 10 mg, Intravenous, Q6H PRN, Katie Lynn MD, 10 mg at 05/19/25 1544    sodium chloride 0.9 % flush 10 mL, 10 mL, Intravenous, PRN, Katie Lynn MD    sodium chloride 0.9 % flush 10 mL, 10 mL, Intravenous, Q12H, Katie Lynn MD    sodium chloride 0.9 % flush 10 mL, 10 mL, Intravenous, PRN, Katie Lynn MD    sodium chloride 0.9 % infusion 40 mL, 40 mL, Intravenous, PRN, Katie Lynn MD    No Known Allergies    Review of Systems   Constitutional:  Negative for appetite change, fatigue, fever and unexpected weight loss.   HENT:  Negative for trouble swallowing.    Gastrointestinal:  Positive for nausea and vomiting. Negative  "for abdominal distention, abdominal pain, anal bleeding, blood in stool, constipation, diarrhea, rectal pain, GERD and indigestion.        Blood in the vomitus        The following portions of the patient's history were reviewed and updated as appropriate: allergies, current medications, past family history, past medical history, past social history, past surgical history and problem list.    Objective     Vitals:    05/19/25 1330 05/19/25 1400 05/19/25 1501 05/19/25 1936   BP: 106/65 98/64 125/68 103/58   BP Location:    Right arm   Patient Position:    Lying   Pulse: 72 77 72 60   Resp:   20 18   Temp:   97.4 °F (36.3 °C) 97.5 °F (36.4 °C)   TempSrc:    Oral   SpO2: 98% 97% 99% 96%   Weight:   61.2 kg (134 lb 14.7 oz)    Height:   185.4 cm (72.99\")        Physical Exam  Constitutional:       Comments: Poorly built and nourished   HENT:      Head: Normocephalic and atraumatic.   Eyes:      Conjunctiva/sclera: Conjunctivae normal.   Abdominal:      General: Abdomen is flat. There is no distension.      Palpations: There is no mass.      Tenderness: There is no abdominal tenderness. There is no guarding or rebound.      Hernia: No hernia is present.   Musculoskeletal:      Cervical back: Normal range of motion and neck supple.   Neurological:      Mental Status: He is alert.         Results from last 7 days   Lab Units 05/19/25  1820 05/19/25  1141   SODIUM mmol/L  --  137   POTASSIUM mmol/L  --  4.0   CHLORIDE mmol/L  --  102   CO2 mmol/L  --  24.7   BUN mg/dL  --  15   CREATININE mg/dL  --  0.95   CALCIUM mg/dL  --  9.5   ALBUMIN g/dL  --  4.5   BILIRUBIN mg/dL  --  0.4   ALK PHOS U/L  --  44   ALT (SGPT) U/L  --  10   AST (SGOT) U/L  --  13   GLUCOSE mg/dL  --  94   WBC 10*3/mm3  --  6.83   HEMOGLOBIN g/dL 13.2 14.1   PLATELETS 10*3/mm3  --  294   INR   --  1.18*       Imaging Results (Last 24 Hours)       Procedure Component Value Units Date/Time    CT Angiogram Chest Pulmonary Embolism [[id number]] Collected: " 05/19/25 1328     Updated: 05/19/25 1332    Narrative:      PROCEDURE: CT ANGIOGRAM CHEST PULMONARY EMBOLISM-     HISTORY: Hematemesis, rule out PE     COMPARISON: None .     TECHNIQUE: Multiple axial CT images were obtained from the thoracic  inlet through the upper abdomen following the administration of Isovue  300 per the CT PE protocol. Coronal and oblique 3D MIP images were  reconstructed from the original axial data set.     FINDINGS: Motion artifact limits the exam. There are no filling defects  within the pulmonary arteries to suggest an embolus. The thoracic aorta  is normal in caliber with no evidence of dissection. The heart is normal  in size. There are no pleural or pericardial effusions. There is no  adenopathy.  Lung windows reveal no focal opacities or suspicious  pulmonary nodules.       Impression:      No evidence of pulmonary embolus or dissection.        DLP: 169.28 mGy.cm  CTDI: 3.24 mGy        This study was performed with techniques to keep radiation doses as low  as reasonably achievable (ALARA). Individualized dose reduction  techniques using automated exposure control or adjustment of mA and/or  kV according to the patient size were employed.        Images were reviewed, interpreted, and dictated by Dr. Shu Mcduffie MD  Transcribed by Annalee Hughes PA-C.     This report was signed and finalized on 5/19/2025 1:30 PM by Shu Mcduffie MD.       CT Abdomen Pelvis With Contrast [[id number]] Collected: 05/19/25 1330     Updated: 05/19/25 1332    Narrative:      PROCEDURE: CT ABDOMEN PELVIS W CONTRAST-     HISTORY: Hematemesis, abdominal pain     COMPARISON:  None .     TECHNIQUE: Multiple axial CT images were obtained from the lung bases  through the pubic symphysis following the administration of Isovue 300  contrast.     FINDINGS:     ABDOMEN: Paucity of intra-abdominal fat decreases exam sensitivity. The  liver is normal. There is mild periportal edema. Consider hepatitis in  the  differential. The gallbladder is present and appears unremarkable.  The spleen is unremarkable. No adrenal mass is present.  The pancreas is  normal. The kidneys are normal. The aorta is normal in caliber. There is  no free fluid or adenopathy. The abdominal portions of the GI tract are  unremarkable with no evidence of obstruction.     PELVIS: The appendix is not identified.  The urinary bladder is  unremarkable. Prostate is normal in size. There is a least 1  calcification in the prostate. There is no significant free fluid or  adenopathy.       Impression:      Periportal edema. Consider possible hepatitis.        CTDI: 3.24 mGy  DLP: 169.28 mGy.cm        This study was performed with techniques to keep radiation doses as low  as reasonably achievable (ALARA). Individualized dose reduction  techniques using automated exposure control or adjustment of mA and/or  kV according to the patient size were employed.                 Images were reviewed, interpreted, and dictated by Dr. Shu Mcduffie MD  Transcribed by Annalee Hughes PA-C.     This report was signed and finalized on 5/19/2025 1:30 PM by Shu Mcduffie MD.       XR Chest 1 View [[id number]] Collected: 05/19/25 1158     Updated: 05/19/25 1201    Narrative:      PROCEDURE: XR CHEST 1 VW-     HISTORY: Chest Pain Triage Protocol, Center chest pain, shortness of  breath in all over chest numbness today.     COMPARISON: None.     FINDINGS: The heart is normal in size. The lungs are clear. The  mediastinum is unremarkable. There is no pneumothorax.  There are no  acute osseous abnormalities. Apical lordotic positioning noted.        Impression:      No acute cardiopulmonary process.              This report was signed and finalized on 5/19/2025 11:59 AM by Shu Mcduffie MD.               Assessment / Plan      Assessment/Recommendations:     Nausea vomiting  Coffee-ground emesis  Daily vaping  Marijuana dependence    Patient history is concerning for cyclical  vomiting associated with the marijuana use.  Patient has been getting these episodes once in few weeks for the last few years.  It gets better by itself.  This time he had some red blood mixed that is why he came to emergency room.  Admission labs and CT scans were reviewed.    Patient likely had a minimal mucosal bleeding from likely erosive esophagitis versus gastric erosion.  No history suggestive any overt GI bleeding.  Patient has been taking ibuprofen intermittently however no convincing signs of peptic ulcer disease.  Patient needs an EGD for further evaluation.  I have discussed the risk and benefits involved and patient is agreeable to proceed with the procedure.      N.p.o. after midnight  Antiemetics as appropriate  IV Protonix twice daily  Acute hepatitis panel  Counseling on abstinence from smoking marijuana and vaping  Scheduled for an EGD in a.m.  Monitor for any overt GI bleeding      Thank you very much for letting me participate in the care of this patient.  Please do not hesitate to call me if you have any questions.    Nicky Taylor MD  [facility] [place]  5/19/2025  19:45 EDT    Please note that portions of this note may have been completed with a voice recognition program.

## 2025-05-20 ENCOUNTER — ANESTHESIA (OUTPATIENT)
Dept: GASTROENTEROLOGY | Facility: HOSPITAL | Age: 30
End: 2025-05-20
Payer: COMMERCIAL

## 2025-05-20 ENCOUNTER — ANESTHESIA EVENT (OUTPATIENT)
Dept: GASTROENTEROLOGY | Facility: HOSPITAL | Age: 30
End: 2025-05-20
Payer: COMMERCIAL

## 2025-05-20 ENCOUNTER — READMISSION MANAGEMENT (OUTPATIENT)
Dept: CALL CENTER | Facility: HOSPITAL | Age: 30
End: 2025-05-20
Payer: COMMERCIAL

## 2025-05-20 VITALS
HEIGHT: 73 IN | OXYGEN SATURATION: 98 % | TEMPERATURE: 97.4 F | DIASTOLIC BLOOD PRESSURE: 57 MMHG | RESPIRATION RATE: 16 BRPM | WEIGHT: 134.92 LBS | HEART RATE: 59 BPM | SYSTOLIC BLOOD PRESSURE: 103 MMHG | BODY MASS INDEX: 17.88 KG/M2

## 2025-05-20 LAB
ANION GAP SERPL CALCULATED.3IONS-SCNC: 7.4 MMOL/L (ref 5–15)
BUN SERPL-MCNC: 11 MG/DL (ref 6–20)
BUN/CREAT SERPL: 11.8 (ref 7–25)
CALCIUM SPEC-SCNC: 9.1 MG/DL (ref 8.6–10.5)
CHLORIDE SERPL-SCNC: 106 MMOL/L (ref 98–107)
CO2 SERPL-SCNC: 26.6 MMOL/L (ref 22–29)
CREAT SERPL-MCNC: 0.93 MG/DL (ref 0.76–1.27)
DEPRECATED RDW RBC AUTO: 44.1 FL (ref 37–54)
EGFRCR SERPLBLD CKD-EPI 2021: 115.4 ML/MIN/1.73
ERYTHROCYTE [DISTWIDTH] IN BLOOD BY AUTOMATED COUNT: 12.9 % (ref 12.3–15.4)
GLUCOSE BLDC GLUCOMTR-MCNC: 81 MG/DL (ref 70–130)
GLUCOSE SERPL-MCNC: 83 MG/DL (ref 65–99)
HAV IGM SERPL QL IA: NORMAL
HBV CORE IGM SERPL QL IA: NORMAL
HBV SURFACE AB SER RIA-ACNC: NORMAL
HBV SURFACE AG SERPL QL IA: NORMAL
HBV SURFACE AG SERPL QL IA: NORMAL
HCT VFR BLD AUTO: 40 % (ref 37.5–51)
HCT VFR BLD AUTO: 40.2 % (ref 37.5–51)
HCT VFR BLD AUTO: 40.3 % (ref 37.5–51)
HCV AB SER QL: NORMAL
HCV AB SER QL: NORMAL
HGB BLD-MCNC: 13.5 G/DL (ref 13–17.7)
HGB BLD-MCNC: 13.6 G/DL (ref 13–17.7)
HGB BLD-MCNC: 13.8 G/DL (ref 13–17.7)
MCH RBC QN AUTO: 31.9 PG (ref 26.6–33)
MCHC RBC AUTO-ENTMCNC: 34 G/DL (ref 31.5–35.7)
MCV RBC AUTO: 93.7 FL (ref 79–97)
PLATELET # BLD AUTO: 272 10*3/MM3 (ref 140–450)
PMV BLD AUTO: 10.1 FL (ref 6–12)
POTASSIUM SERPL-SCNC: 4.3 MMOL/L (ref 3.5–5.2)
RBC # BLD AUTO: 4.27 10*6/MM3 (ref 4.14–5.8)
SODIUM SERPL-SCNC: 140 MMOL/L (ref 136–145)
WBC NRBC COR # BLD AUTO: 7.09 10*3/MM3 (ref 3.4–10.8)

## 2025-05-20 PROCEDURE — G0378 HOSPITAL OBSERVATION PER HR: HCPCS

## 2025-05-20 PROCEDURE — 25810000003 SODIUM CHLORIDE 0.9 % SOLUTION: Performed by: INTERNAL MEDICINE

## 2025-05-20 PROCEDURE — 85027 COMPLETE CBC AUTOMATED: CPT | Performed by: FAMILY MEDICINE

## 2025-05-20 PROCEDURE — 96376 TX/PRO/DX INJ SAME DRUG ADON: CPT

## 2025-05-20 PROCEDURE — 80048 BASIC METABOLIC PNL TOTAL CA: CPT | Performed by: FAMILY MEDICINE

## 2025-05-20 PROCEDURE — 25010000002 PROPOFOL 10 MG/ML EMULSION: Performed by: NURSE ANESTHETIST, CERTIFIED REGISTERED

## 2025-05-20 PROCEDURE — 99238 HOSP IP/OBS DSCHRG MGMT 30/<: CPT | Performed by: FAMILY MEDICINE

## 2025-05-20 PROCEDURE — 86704 HEP B CORE ANTIBODY TOTAL: CPT | Performed by: INTERNAL MEDICINE

## 2025-05-20 PROCEDURE — 86803 HEPATITIS C AB TEST: CPT | Performed by: INTERNAL MEDICINE

## 2025-05-20 PROCEDURE — 85014 HEMATOCRIT: CPT | Performed by: FAMILY MEDICINE

## 2025-05-20 PROCEDURE — 85018 HEMOGLOBIN: CPT | Performed by: FAMILY MEDICINE

## 2025-05-20 PROCEDURE — 82948 REAGENT STRIP/BLOOD GLUCOSE: CPT

## 2025-05-20 PROCEDURE — 86706 HEP B SURFACE ANTIBODY: CPT | Performed by: INTERNAL MEDICINE

## 2025-05-20 PROCEDURE — 43239 EGD BIOPSY SINGLE/MULTIPLE: CPT | Performed by: INTERNAL MEDICINE

## 2025-05-20 PROCEDURE — 25010000002 FENTANYL CITRATE (PF) 50 MCG/ML SOLUTION PREFILLED SYRINGE: Performed by: NURSE ANESTHETIST, CERTIFIED REGISTERED

## 2025-05-20 PROCEDURE — 86708 HEPATITIS A ANTIBODY: CPT | Performed by: INTERNAL MEDICINE

## 2025-05-20 PROCEDURE — 25010000002 LIDOCAINE HCL (CARDIAC) 100 MG/5ML SOLUTION PREFILLED SYRINGE: Performed by: NURSE ANESTHETIST, CERTIFIED REGISTERED

## 2025-05-20 PROCEDURE — 87340 HEPATITIS B SURFACE AG IA: CPT | Performed by: INTERNAL MEDICINE

## 2025-05-20 RX ORDER — FENTANYL CITRATE 50 UG/ML
INJECTION, SOLUTION INTRAMUSCULAR; INTRAVENOUS AS NEEDED
Status: DISCONTINUED | OUTPATIENT
Start: 2025-05-20 | End: 2025-05-20 | Stop reason: SURG

## 2025-05-20 RX ORDER — LIDOCAINE HCL/PF 100 MG/5ML
SYRINGE (ML) INJECTION AS NEEDED
Status: DISCONTINUED | OUTPATIENT
Start: 2025-05-20 | End: 2025-05-20 | Stop reason: SURG

## 2025-05-20 RX ORDER — SODIUM CHLORIDE 9 MG/ML
50 INJECTION, SOLUTION INTRAVENOUS CONTINUOUS
Status: DISCONTINUED | OUTPATIENT
Start: 2025-05-20 | End: 2025-05-20

## 2025-05-20 RX ORDER — PROPOFOL 10 MG/ML
VIAL (ML) INTRAVENOUS AS NEEDED
Status: DISCONTINUED | OUTPATIENT
Start: 2025-05-20 | End: 2025-05-20 | Stop reason: SURG

## 2025-05-20 RX ORDER — PANTOPRAZOLE SODIUM 40 MG/1
40 TABLET, DELAYED RELEASE ORAL DAILY
Qty: 30 TABLET | Refills: 1 | Status: SHIPPED | OUTPATIENT
Start: 2025-05-20 | End: 2025-07-19

## 2025-05-20 RX ADMIN — SODIUM CHLORIDE 50 ML/HR: 9 INJECTION, SOLUTION INTRAVENOUS at 06:57

## 2025-05-20 RX ADMIN — LIDOCAINE HYDROCHLORIDE 50 MG: 20 INJECTION INTRAVENOUS at 07:46

## 2025-05-20 RX ADMIN — PROPOFOL 50 MG: 10 INJECTION, EMULSION INTRAVENOUS at 07:46

## 2025-05-20 RX ADMIN — FENTANYL CITRATE 50 MCG: 50 INJECTION, SOLUTION INTRAMUSCULAR; INTRAVENOUS at 07:46

## 2025-05-20 RX ADMIN — PANTOPRAZOLE SODIUM 40 MG: 40 INJECTION, POWDER, FOR SOLUTION INTRAVENOUS at 06:31

## 2025-05-20 NOTE — DISCHARGE INSTRUCTIONS
- Avoid any THC and vaping as directed  -Avoid NSAIDs (this includes ibuprofen, Advil and Aleve etc.)  -Continue Protonix as prescribed  -Follow-up with gastroenterology in the office in 8 weeks.  You will discuss biopsy results with GI doctor on follow-up.  -Follow-up with PCP in 2 to 3 days for recheck and continued care.

## 2025-05-20 NOTE — ANESTHESIA POSTPROCEDURE EVALUATION
Patient: Suleiman Urena    Procedure Summary       Date: 05/20/25 Room / Location: [facility] ENDOSCOPY 2 / [facility] ENDOSCOPY    Anesthesia Start: 0745 Anesthesia Stop: 0757    Procedure: Esophagogastroduodenscopy with biopsy (Esophagus) Diagnosis:       Hematemesis, unspecified whether nausea present      (Hematemesis, unspecified whether nausea present [K92.0])    Surgeons: Nicky Taylor MD Provider: Rick Gil CRNA    Anesthesia Type: MAC ASA Status: 2 - Emergent            Anesthesia Type: MAC    Vitals  No vitals data found for the desired time range.          Post Anesthesia Care and Evaluation    Patient location during evaluation: bedside  Patient participation: complete - patient participated  Level of consciousness: awake  Pain score: 0  Pain management: adequate    Airway patency: patent  Anesthetic complications: No anesthetic complications  PONV Status: controlled  Cardiovascular status: acceptable and stable  Respiratory status: acceptable and room air  Hydration status: acceptable    Comments: Vital signs as noted in nursing documentation as per protocol

## 2025-05-20 NOTE — PAYOR COMM NOTE
"TO:[employer]  FROM:VEENA BEAULIEU, RN PHONE [phone]  OBSERVATION NOTIFICATION  TAX ID [id number] NP [id number]    Suleiman Gibson (29 y.o. Male)       Date of Birth   1995    Social Security Number       Address   [address] [facility] [address]    Home Phone   [phone]    MRN   [id number]       Jain   None    Marital Status   Single                            Admission Date   5/19/2025    Admission Type   Emergency    Admitting Provider   Katie Lynn MD    Attending Provider   Katie Lynn MD    Department, Room/Bed   [facility] TELEMETRY 4, 432/1       Discharge Date       Discharge Disposition       Discharge Destination                                 Attending Provider: Katie Lynn MD    Allergies: No Known Allergies    Isolation: None   Infection: None   Code Status: CPR    Ht: 185.4 cm (72.99\")   Wt: 61.2 kg (134 lb 14.7 oz)    Admission Cmt: None   Principal Problem: Hematemesis [K92.0]                   Active Insurance as of 5/19/2025       Primary Coverage       Payor Plan Insurance Group Employer/Plan Group    [employer] [employer]        Payor Plan Address Payor Plan Phone Number Payor Plan Fax Number Effective Dates    [address]   4/1/2025 - None Entered    [place] [facility] [address]         Subscriber Name Subscriber Birth Date Member ID       SULEIMAN GIBSON 1995 [id number]                     Emergency Contacts        (Rel.) Home Phone Work Phone Mobile Phone    ANDREA DUNBAR (Significant Other) [phone] -- --              Insurance Information                  [employer] [place] [place] Phone: --    Subscriber: Suleiman Gibson Subscriber#: [id number]    Group#: -- Precert#: --    Authorization#: [employer] Effective Date: --          "

## 2025-05-20 NOTE — ANESTHESIA PREPROCEDURE EVALUATION
Anesthesia Evaluation     Patient summary reviewed and Nursing notes reviewed   NPO Solid Status: > 8 hours  NPO Liquid Status: > 8 hours           Airway   Mallampati: II  TM distance: >3 FB  Neck ROM: full  Possible difficult intubation  Dental - normal exam     Pulmonary - normal exam   Cardiovascular - normal exam        Neuro/Psych  GI/Hepatic/Renal/Endo    (+) GI bleeding upper active bleeding    Musculoskeletal     Abdominal  - normal exam   Substance History      OB/GYN          Other                    Anesthesia Plan    ASA 2 - emergent     MAC     intravenous induction     Anesthetic plan, risks, benefits, and alternatives have been provided, discussed and informed consent has been obtained with: patient.  Pre-procedure education provided  Plan discussed with CRNA.    CODE STATUS:    Code Status (Patient has no pulse and is not breathing): CPR (Attempt to Resuscitate)  Medical Interventions (Patient has pulse or is breathing): Full Support

## 2025-05-20 NOTE — PLAN OF CARE
Problem: Adult Inpatient Plan of Care  Goal: Plan of Care Review  Outcome: Progressing  Goal: Patient-Specific Goal (Individualized)  Outcome: Progressing  Goal: Absence of Hospital-Acquired Illness or Injury  Outcome: Progressing  Intervention: Identify and Manage Fall Risk  Recent Flowsheet Documentation  Taken 5/20/2025 0200 by Inga Lopez RN  Safety Promotion/Fall Prevention: safety round/check completed  Taken 5/20/2025 0000 by Inga Lopez RN  Safety Promotion/Fall Prevention: safety round/check completed  Taken 5/19/2025 2200 by Inga Lopez RN  Safety Promotion/Fall Prevention: safety round/check completed  Taken 5/19/2025 2000 by Inga Lopez RN  Safety Promotion/Fall Prevention: safety round/check completed  Intervention: Prevent Skin Injury  Recent Flowsheet Documentation  Taken 5/20/2025 0200 by Inga Lopez RN  Body Position:   side-lying   left  Taken 5/20/2025 0000 by Inga oLpez RN  Body Position:   side-lying   left  Taken 5/19/2025 2200 by Inga Lopez RN  Body Position:   side-lying   left  Taken 5/19/2025 2000 by Inga Lopez RN  Body Position:   side-lying   left  Goal: Optimal Comfort and Wellbeing  Outcome: Progressing  Goal: Readiness for Transition of Care  Outcome: Progressing     Problem: Comorbidity Management  Goal: Maintenance of Asthma Control  Outcome: Progressing     Problem: Gastrointestinal Bleeding  Goal: Optimal Coping with Acute Illness  Outcome: Progressing  Goal: Hemostasis  Outcome: Progressing   Goal Outcome Evaluation:

## 2025-05-20 NOTE — PLAN OF CARE
Goal Outcome Evaluation:  Plan of Care Reviewed With: patient, family        Pt. Stable for discharge home per Dr. Lynn orders.

## 2025-05-20 NOTE — DISCHARGE SUMMARY
[facility]      Name:  Suleiman Urena   Age:  29 y.o.  Sex:  male  :  1995  MRN:  [id number]   Visit Number:  [id number]    Admission Date:  2025  Date of Discharge:  2025  Primary Care Physician:  Provider, No Known    Important issues to note:    - Prescribed Protonix  - Avoid THC and vaping  - Follow-up with GI in the office in 8 weeks, to discuss biopsies on follow-up  - Follow-up with PCP    Discharge Diagnoses:     Cyclic vomiting, POA  THC use    Problem List:     Active Hospital Problems    Diagnosis  POA    **Hematemesis [K92.0]  Yes      Resolved Hospital Problems   No resolved problems to display.     Presenting Problem:    Chief Complaint   Patient presents with    Vomiting Blood     Pt. Reports that he has been vomiting blood since this morning, pt. Reports that this has happened before over the past few years but he hasn't been seen for it.     Chest Pain      Consults:     Consulting Physician(s)         Provider   Role Specialty     Nicky Taylor MD      Consulting Physician Gastroenterology          Procedures Performed:    Procedure(s):  Esophagogastroduodenscopy with biopsy    History of presenting illness/Hospital Course:    Patient is a 29 years old male with no significant past medical history who presented to the ER with a chief complaint of vomiting blood.  Patient reports that he woke up this morning and started having pain in his chest then felt nauseated and started throwing up, he noticed bright red blood streaks along with mucus coming up his vomit. Patient reports that he has been having those episodes of vomiting for years now and they come and go but he never was evaluated for it.  Patient reports that he took ibuprofen yesterday, he usually takes ibuprofen for headaches, but denies any excessive use of ibuprofen recently.  He vapes and reporting THC use.      On ER evaluation, Vitals were stable, afebrile,  room air.  His workup in the ER was with 2 negative troponins, CBC and CMP nonactionable.  INR 1.18.  Chest x-ray with no acute process.  CTA chest with no evidence of PE or dissection.  CT abdomen pelvis with contrast showed periportal edema, consider possible hepatitis.  Case discussed with GI Dr. Harrell, recommended admission, PPI and will consult on the patient for EGD tomorrow.  Hospitalist consulted for admission, further management and treatment.    Cyclic vomiting  Marijuana use  - Gastroenterology Dr. Harrell consulted, appreciate recommendations.  - Patient had an EGD showed no PUD or current bleeding, erosive gastropathy with no stigmata of recent bleeding. Mild diffuse erythematous mucosa in the antrum and prepyloric region of the stomach. Biopsied.  -Patient cleared for discharge by GI.   -Avoid NSAIDs  -Acute hepatitis panel nonreactive  -Discussed avoiding THC and vaping  -Prescribed PPI  -Follow-up with GI, discussed biopsies of follow-up     Patient was seen and examined on the day of discharge.  His family including his wife and baby child at bedside.  Nursing at bedside.  Patient sitting up comfortably in bed with no distress.  Patient reports feeling much better and his symptoms overall resolved and he is eager to go home.  He is tolerating p.o. well.  No longer having any nausea or vomiting.  Denies any abdominal pain.  Denies any acute complaints otherwise.  Patient ambulatory.  Discussed the results of his EGD.  Discussed recommendations from GI.  Prescribed Protonix.  Discussed avoidance of vaping and THC.  Patient instructed on management and plan in details.  Follow-up with GI. Patient to follow-up with PCP in 2 to 3 days for recheck and continued care. Clear return precautions discussed.  Patient verbalized understanding and agreeable to plan.  All questions were answered to the patient's satisfaction.  Patient has reached maximum medical improvement of inpatient hospital stay. Patient  is discharged in stable condition.    Vital Signs:    Temp:  [97.4 °F (36.3 °C)-98.7 °F (37.1 °C)] 97.4 °F (36.3 °C)  Heart Rate:  [50-79] 59  Resp:  [16-20] 16  BP: ()/(47-68) 103/57    Physical Exam:    General Appearance:  Alert and cooperative.  No acute distress.   Head:  Atraumatic and normocephalic.   Eyes: Conjunctivae and sclerae normal, no icterus. No pallor.   Ears:  Ears with no abnormalities noted.   Throat: No oral lesions, no thrush, oral mucosa moist.   Neck: Supple, trachea midline, no thyromegaly.   Back:   No kyphoscoliosis present. No tenderness to palpation.   Lungs:   Breath sounds heard bilaterally equally.  No crackles or wheezing. No Pleural rub or bronchial breathing.   Heart:  Normal S1 and S2, no murmur, no gallop, no rub. No JVD.   Abdomen:   Normal bowel sounds, no masses, no organomegaly. Soft, nontender, nondistended, no rebound tenderness.   Extremities: Supple, no edema, no cyanosis, no clubbing.   Pulses: Pulses palpable bilaterally.   Skin: No bleeding or rash.   Neurologic: Alert and oriented x 3. No facial asymmetry. Moves all four limbs. No tremors.     Pertinent Lab Results:     Results from last 7 days   Lab Units 05/20/25  0503 05/19/25  1141   SODIUM mmol/L 140 137   POTASSIUM mmol/L 4.3 4.0   CHLORIDE mmol/L 106 102   CO2 mmol/L 26.6 24.7   BUN mg/dL 11 15   CREATININE mg/dL 0.93 0.95   CALCIUM mg/dL 9.1 9.5   BILIRUBIN mg/dL  --  0.4   ALK PHOS U/L  --  44   ALT (SGPT) U/L  --  10   AST (SGOT) U/L  --  13   GLUCOSE mg/dL 83 94     Results from last 7 days   Lab Units 05/20/25  0503 05/20/25  0014 05/19/25  1820 05/19/25  1141   WBC 10*3/mm3 7.09  --   --  6.83   HEMOGLOBIN g/dL 13.5  13.6 13.8 13.2 14.1   HEMATOCRIT % 40.3  40.0 40.2 39.1 40.5   PLATELETS 10*3/mm3 272  --   --  294     Results from last 7 days   Lab Units 05/19/25  1141   INR  1.18*     Results from last 7 days   Lab Units 05/19/25  1307 05/19/25  1141   HSTROP T ng/L <6 <6                            Pertinent Radiology Results:    Imaging Results (All)       Procedure Component Value Units Date/Time    CT Angiogram Chest Pulmonary Embolism [[id number]] Collected: 05/19/25 1328     Updated: 05/19/25 1332    Narrative:      PROCEDURE: CT ANGIOGRAM CHEST PULMONARY EMBOLISM-     HISTORY: Hematemesis, rule out PE     COMPARISON: None .     TECHNIQUE: Multiple axial CT images were obtained from the thoracic  inlet through the upper abdomen following the administration of Isovue  300 per the CT PE protocol. Coronal and oblique 3D MIP images were  reconstructed from the original axial data set.     FINDINGS: Motion artifact limits the exam. There are no filling defects  within the pulmonary arteries to suggest an embolus. The thoracic aorta  is normal in caliber with no evidence of dissection. The heart is normal  in size. There are no pleural or pericardial effusions. There is no  adenopathy.  Lung windows reveal no focal opacities or suspicious  pulmonary nodules.       Impression:      No evidence of pulmonary embolus or dissection.        DLP: 169.28 mGy.cm  CTDI: 3.24 mGy        This study was performed with techniques to keep radiation doses as low  as reasonably achievable (ALARA). Individualized dose reduction  techniques using automated exposure control or adjustment of mA and/or  kV according to the patient size were employed.        Images were reviewed, interpreted, and dictated by Dr. Shu Mcduffie MD  Transcribed by Annalee Hughes PA-C.     This report was signed and finalized on 5/19/2025 1:30 PM by Shu Mcduffie MD.       CT Abdomen Pelvis With Contrast [[id number]] Collected: 05/19/25 1330     Updated: 05/19/25 1332    Narrative:      PROCEDURE: CT ABDOMEN PELVIS W CONTRAST-     HISTORY: Hematemesis, abdominal pain     COMPARISON:  None .     TECHNIQUE: Multiple axial CT images were obtained from the lung bases  through the pubic symphysis following the administration of Isovue 300  contrast.      FINDINGS:     ABDOMEN: Paucity of intra-abdominal fat decreases exam sensitivity. The  liver is normal. There is mild periportal edema. Consider hepatitis in  the differential. The gallbladder is present and appears unremarkable.  The spleen is unremarkable. No adrenal mass is present.  The pancreas is  normal. The kidneys are normal. The aorta is normal in caliber. There is  no free fluid or adenopathy. The abdominal portions of the GI tract are  unremarkable with no evidence of obstruction.     PELVIS: The appendix is not identified.  The urinary bladder is  unremarkable. Prostate is normal in size. There is a least 1  calcification in the prostate. There is no significant free fluid or  adenopathy.       Impression:      Periportal edema. Consider possible hepatitis.        CTDI: 3.24 mGy  DLP: 169.28 mGy.cm        This study was performed with techniques to keep radiation doses as low  as reasonably achievable (ALARA). Individualized dose reduction  techniques using automated exposure control or adjustment of mA and/or  kV according to the patient size were employed.                 Images were reviewed, interpreted, and dictated by Dr. Shu Mcduffie MD  Transcribed by Annlaee Hughes PA-C.     This report was signed and finalized on 5/19/2025 1:30 PM by Shu Mcduffie MD.       XR Chest 1 View [[id number]] Collected: 05/19/25 1158     Updated: 05/19/25 1201    Narrative:      PROCEDURE: XR CHEST 1 VW-     HISTORY: Chest Pain Triage Protocol, Center chest pain, shortness of  breath in all over chest numbness today.     COMPARISON: None.     FINDINGS: The heart is normal in size. The lungs are clear. The  mediastinum is unremarkable. There is no pneumothorax.  There are no  acute osseous abnormalities. Apical lordotic positioning noted.        Impression:      No acute cardiopulmonary process.              This report was signed and finalized on 5/19/2025 11:59 AM by Shu Mcduffie MD.                Echo:      Condition on Discharge:      Stable.    Code status during the hospital stay:    Code Status and Medical Interventions: CPR (Attempt to Resuscitate); Full Support   Ordered at: 05/19/25 1555     Code Status (Patient has no pulse and is not breathing):    CPR (Attempt to Resuscitate)     Medical Interventions (Patient has pulse or is breathing):    Full Support     Discharge Disposition:    Home or Self Care    Discharge Medications:       Discharge Medications        New Medications        Instructions Start Date   pantoprazole 40 MG EC tablet  Commonly known as: Protonix   40 mg, Oral, Daily             Continue These Medications        Instructions Start Date   albuterol sulfate  (90 Base) MCG/ACT inhaler  Commonly known as: PROVENTIL HFA;VENTOLIN HFA;PROAIR HFA   2 puffs, Inhalation, Every 4 Hours PRN             Discharge Diet:     Diet Instructions       Diet: Regular/House Diet; Regular (IDDSI 7); Thin (IDDSI 0)      Discharge Diet: Regular/House Diet    Texture: Regular (IDDSI 7)    Fluid Consistency: Thin (IDDSI 0)          Activity at Discharge:   As tolerated    Follow-up Appointments:     Follow-up Information       Provider, No Known Follow up in 3 day(s).    Contact information:  [facility] [address] [phone]               Nicky Taylor MD Follow up in 8 week(s).    Specialty: Gastroenterology  Contact information:  [address] [place] [address] [address], Medical Office [address]  [facility] [address] [phone]                           No future appointments.  Test Results Pending at Discharge:    Pending Results       Procedure [Order ID] Specimen - Date/Time    Hemoglobin & Hematocrit, Blood [[id number]]     Specimen: Blood     Hemoglobin & Hematocrit, Blood [[id number]]     Specimen: Blood     Hepatitis A Antibody, Total [[id number]] Collected: 05/20/25 0014    Specimen: Blood Updated: 05/20/25 0024    Hepatitis B Core Antibody, Total [[id number]] Collected:  05/20/25 0014    Specimen: Blood Updated: 05/20/25 0024    Hepatitis B Surface Antibody [[id number]] Collected: 05/20/25 0014    Specimen: Blood Updated: 05/20/25 0024    Hepatitis B Surface Antigen [[id number]] Collected: 05/20/25 0503    Specimen: Blood Updated: 05/20/25 0829    TISSUE EXAM, [facility] ([facility]) [[id number]] Collected: 05/20/25 0750    Specimen: Tissue from Gastric, Antrum                  Katie Lynn MD  05/20/25  11:47 EDT    Time: I spent 28 minutes on this discharge activity which included: face-to-face encounter with the patient, reviewing the data in the system, coordination of the care with the nursing staff as well as consultants, documentation, and entering orders.     Dictated utilizing Dragon dictation.

## 2025-05-21 ENCOUNTER — TRANSITIONAL CARE MANAGEMENT TELEPHONE ENCOUNTER (OUTPATIENT)
Dept: CALL CENTER | Facility: HOSPITAL | Age: 30
End: 2025-05-21
Payer: COMMERCIAL

## 2025-05-21 LAB
HAV AB SER QL IA: NEGATIVE
HBV CORE AB SERPL QL IA: NEGATIVE
REF LAB TEST METHOD: NORMAL

## 2025-05-21 NOTE — OUTREACH NOTE
Prep Survey      Flowsheet Row Responses   [facility] facility patient discharged from? [facility]   Is LACE score < 7 ? Yes   Eligibility [facility]   Date of Admission 05/19/25   Date of Discharge 05/20/25   Discharge Disposition Home or Self Care   Discharge diagnosis Hematemesis-EGD with biopsy   Does the patient have one of the following disease processes/diagnoses(primary or secondary)? Other   Does the patient have Home health ordered? No   Is there a DME ordered? No   Prep survey completed? Yes            SEBASTIEN RODRIUGEZ - Registered Nurse

## 2025-05-21 NOTE — OUTREACH NOTE
Call Center TCM Note      Flowsheet Row Responses   [facility] patient discharged from? [facility]   Does the patient have one of the following disease processes/diagnoses(primary or secondary)? Other   TCM attempt successful? No   Unsuccessful attempts Attempt 1            FARIDA FLANAGAN - Registered Nurse    5/21/2025, 11:03 EDT

## 2025-05-21 NOTE — OUTREACH NOTE
Call Center TCM Note      Flowsheet Row Responses   [facility] patient discharged from? [facility]   Does the patient have one of the following disease processes/diagnoses(primary or secondary)? Other   TCM attempt successful? No   Unsuccessful attempts Attempt 2            FARIDA FLANAGAN - Registered Nurse    5/21/2025, 12:00 EDT

## 2025-05-22 ENCOUNTER — TRANSITIONAL CARE MANAGEMENT TELEPHONE ENCOUNTER (OUTPATIENT)
Dept: CALL CENTER | Facility: HOSPITAL | Age: 30
End: 2025-05-22
Payer: COMMERCIAL

## 2025-05-22 NOTE — OUTREACH NOTE
Call Center TCM Note      Flowsheet Row Responses   [facility] patient discharged from? [facility]   Does the patient have one of the following disease processes/diagnoses(primary or secondary)? Other   TCM attempt successful? No   Unsuccessful attempts Attempt 3            India GALLAGHER - Registered Nurse    5/22/2025, 08:37 EDT

## (undated) DEVICE — Device

## (undated) DEVICE — CANISTER, RIGID, 2000CC: Brand: MEDLINE INDUSTRIES, INC.

## (undated) DEVICE — CONMED SCOPE SAVER BITE BLOCK, 20X27 MM: Brand: SCOPE SAVER

## (undated) DEVICE — Device: Brand: SINGLE USE INJECTOR

## (undated) DEVICE — HYBRID CO2 TUBING/CAP SET FOR OLYMPUS® SCOPES & CO2 SOURCE: Brand: ERBE

## (undated) DEVICE — ENDOSCOPY PORT CONNECTOR FOR OLYMPUS® SCOPES: Brand: ERBE

## (undated) DEVICE — FRCP BX RADJAW4 NDL 2.8 240 STD OG